# Patient Record
Sex: FEMALE | Race: WHITE | NOT HISPANIC OR LATINO | Employment: OTHER | URBAN - METROPOLITAN AREA
[De-identification: names, ages, dates, MRNs, and addresses within clinical notes are randomized per-mention and may not be internally consistent; named-entity substitution may affect disease eponyms.]

---

## 2017-04-27 ENCOUNTER — APPOINTMENT (EMERGENCY)
Dept: ULTRASOUND IMAGING | Facility: HOSPITAL | Age: 72
End: 2017-04-27
Payer: MEDICARE

## 2017-04-27 ENCOUNTER — APPOINTMENT (EMERGENCY)
Dept: RADIOLOGY | Facility: HOSPITAL | Age: 72
End: 2017-04-27
Payer: MEDICARE

## 2017-04-27 ENCOUNTER — HOSPITAL ENCOUNTER (EMERGENCY)
Facility: HOSPITAL | Age: 72
Discharge: HOME | End: 2017-04-27
Attending: EMERGENCY MEDICINE | Admitting: EMERGENCY MEDICINE
Payer: MEDICARE

## 2017-04-27 VITALS
RESPIRATION RATE: 16 BRPM | TEMPERATURE: 98.4 F | WEIGHT: 140 LBS | SYSTOLIC BLOOD PRESSURE: 110 MMHG | OXYGEN SATURATION: 98 % | DIASTOLIC BLOOD PRESSURE: 61 MMHG | HEART RATE: 62 BPM | BODY MASS INDEX: 25.61 KG/M2

## 2017-04-27 DIAGNOSIS — G56.01 CARPAL TUNNEL SYNDROME OF RIGHT WRIST: Primary | ICD-10-CM

## 2017-04-27 PROCEDURE — 73110 X-RAY EXAM OF WRIST: CPT

## 2017-04-27 PROCEDURE — 93971 EXTREMITY STUDY: CPT

## 2017-04-27 PROCEDURE — 99284 EMERGENCY DEPT VISIT MOD MDM: CPT

## 2017-04-27 RX ORDER — OXYCODONE HYDROCHLORIDE AND ACETAMINOPHEN 5; 325 MG/1; MG/1
1 TABLET ORAL EVERY 6 HOURS PRN
Qty: 6 TABLET | Refills: 0 | Status: SHIPPED | OUTPATIENT
Start: 2017-04-27 | End: 2017-05-07

## 2017-04-27 RX ORDER — OXYCODONE HYDROCHLORIDE 5 MG/1
5 TABLET ORAL ONCE
Status: COMPLETED | OUTPATIENT
Start: 2017-04-27 | End: 2017-04-27

## 2017-04-27 RX ORDER — METHYLPREDNISOLONE 4 MG/1
TABLET ORAL
Qty: 21 TABLET | Refills: 0 | Status: SHIPPED | OUTPATIENT
Start: 2017-04-27 | End: 2017-09-02

## 2017-04-27 RX ADMIN — OXYCODONE HYDROCHLORIDE 5 MG: 5 TABLET ORAL at 20:11

## 2017-05-03 ENCOUNTER — ALLSCRIPTS OFFICE VISIT (OUTPATIENT)
Dept: OTHER | Facility: OTHER | Age: 72
End: 2017-05-03

## 2017-05-09 ENCOUNTER — GENERIC CONVERSION - ENCOUNTER (OUTPATIENT)
Dept: OTHER | Facility: OTHER | Age: 72
End: 2017-05-09

## 2017-09-02 ENCOUNTER — HOSPITAL ENCOUNTER (EMERGENCY)
Facility: HOSPITAL | Age: 72
Discharge: HOME | End: 2017-09-02
Admitting: EMERGENCY MEDICINE
Payer: MEDICARE

## 2017-09-02 ENCOUNTER — APPOINTMENT (EMERGENCY)
Dept: RADIOLOGY | Facility: HOSPITAL | Age: 72
End: 2017-09-02
Payer: MEDICARE

## 2017-09-02 VITALS
SYSTOLIC BLOOD PRESSURE: 144 MMHG | OXYGEN SATURATION: 97 % | WEIGHT: 140 LBS | BODY MASS INDEX: 25.61 KG/M2 | HEART RATE: 56 BPM | TEMPERATURE: 98.3 F | RESPIRATION RATE: 16 BRPM | DIASTOLIC BLOOD PRESSURE: 81 MMHG

## 2017-09-02 DIAGNOSIS — M79.671 RIGHT FOOT PAIN: Primary | ICD-10-CM

## 2017-09-02 PROCEDURE — 99283 EMERGENCY DEPT VISIT LOW MDM: CPT

## 2017-09-02 PROCEDURE — 96372 THER/PROPH/DIAG INJ SC/IM: CPT

## 2017-09-02 PROCEDURE — 73630 X-RAY EXAM OF FOOT: CPT

## 2017-09-02 RX ORDER — KETOROLAC TROMETHAMINE 30 MG/ML
30 INJECTION, SOLUTION INTRAMUSCULAR; INTRAVENOUS ONCE
Status: COMPLETED | OUTPATIENT
Start: 2017-09-02 | End: 2017-09-02

## 2017-09-02 RX ORDER — ACETAMINOPHEN 325 MG/1
650 TABLET ORAL ONCE
Status: COMPLETED | OUTPATIENT
Start: 2017-09-02 | End: 2017-09-02

## 2017-09-02 RX ADMIN — ACETAMINOPHEN 650 MG: 325 TABLET ORAL at 12:35

## 2017-09-02 RX ADMIN — KETOROLAC TROMETHAMINE 30 MG: 30 INJECTION, SOLUTION INTRAMUSCULAR at 12:52

## 2017-10-12 ENCOUNTER — TRANSCRIBE ORDERS (OUTPATIENT)
Dept: ADMINISTRATIVE | Facility: HOSPITAL | Age: 72
End: 2017-10-12

## 2017-10-12 ENCOUNTER — HOSPITAL ENCOUNTER (OUTPATIENT)
Dept: RADIOLOGY | Facility: HOSPITAL | Age: 72
Discharge: HOME | End: 2017-10-12
Attending: PODIATRIST
Payer: MEDICARE

## 2017-10-12 DIAGNOSIS — M79.673 PAIN OF PLANTAR ASPECT OF HEEL: ICD-10-CM

## 2017-10-12 DIAGNOSIS — M25.872 MASS OF JOINT OF LEFT FOOT: Primary | ICD-10-CM

## 2017-10-12 DIAGNOSIS — G57.62 NEUROMA OF SECOND INTERSPACE OF LEFT FOOT: ICD-10-CM

## 2017-10-12 DIAGNOSIS — M25.872 MASS OF JOINT OF LEFT FOOT: ICD-10-CM

## 2017-10-12 PROCEDURE — 73630 X-RAY EXAM OF FOOT: CPT

## 2017-10-24 ENCOUNTER — TRANSCRIBE ORDERS (OUTPATIENT)
Dept: ADMINISTRATIVE | Facility: HOSPITAL | Age: 72
End: 2017-10-24

## 2017-10-24 DIAGNOSIS — Z12.31 ENCOUNTER FOR SCREENING MAMMOGRAM FOR MALIGNANT NEOPLASM OF BREAST: Primary | ICD-10-CM

## 2017-11-09 ENCOUNTER — HOSPITAL ENCOUNTER (OUTPATIENT)
Dept: MRI IMAGING | Facility: HOSPITAL | Age: 72
Discharge: HOME | End: 2017-11-09
Attending: PODIATRIST
Payer: MEDICARE

## 2017-11-09 DIAGNOSIS — G57.62 NEUROMA OF SECOND INTERSPACE OF LEFT FOOT: ICD-10-CM

## 2017-11-09 DIAGNOSIS — M25.872 MASS OF JOINT OF LEFT FOOT: ICD-10-CM

## 2017-11-09 PROCEDURE — A9585 GADOBUTROL INJECTION: HCPCS | Performed by: PODIATRIST

## 2017-11-09 PROCEDURE — 73720 MRI LWR EXTREMITY W/O&W/DYE: CPT

## 2017-11-09 RX ADMIN — GADOBUTROL 6 ML: 604.72 INJECTION INTRAVENOUS at 21:30

## 2017-12-14 ENCOUNTER — ANESTHESIA EVENT (OUTPATIENT)
Dept: PERIOP | Facility: HOSPITAL | Age: 72
End: 2017-12-14
Payer: MEDICARE

## 2017-12-14 RX ORDER — MULTIVITAMIN
1 CAPSULE ORAL DAILY
COMMUNITY

## 2017-12-14 RX ORDER — MELATONIN
1000 DAILY
COMMUNITY
End: 2018-07-17 | Stop reason: SDUPTHER

## 2017-12-14 RX ORDER — OMEGA-3S/DHA/EPA/FISH OIL/D3 300MG-1000
400 CAPSULE ORAL DAILY
COMMUNITY
End: 2018-07-17 | Stop reason: SDUPTHER

## 2017-12-14 NOTE — PRE-PROCEDURE INSTRUCTIONS
Pre-Surgery Instructions:   Medication Instructions    BIOTIN PO Instructed patient per Anesthesia Guidelines   cholecalciferol (VITAMIN D3) 1,000 units tablet Instructed patient per Anesthesia Guidelines   cholecalciferol (VITAMIN D3) 400 units tablet Instructed patient per Anesthesia Guidelines   citalopram (CeleXA) 20 mg tablet Instructed patient per Anesthesia Guidelines   hydroxychloroquine (PLAQUENIL) 200 mg tablet Instructed patient per Anesthesia Guidelines   Multiple Vitamin (MULTIVITAMIN) capsule Instructed patient per Anesthesia Guidelines   omeprazole (PriLOSEC) 20 mg delayed release capsule Instructed patient per Anesthesia Guidelines  Spoke to patient via telephone  Medications reviewed and patient was instructed to take omeprazole am of surgery with a sip of water as per anesthesia guidelines  Patient was instructed to avoid NSAIDs, Aspirin, vitamins, and supplements today and prior to surgery tomorrow  [facility] pre-op instructions reviewed  Pre-op bathing reviewed and patient was instructed to use chlorhexidine soap or dial antibacterial soap

## 2017-12-15 ENCOUNTER — ANESTHESIA (OUTPATIENT)
Dept: PERIOP | Facility: HOSPITAL | Age: 72
End: 2017-12-15
Payer: MEDICARE

## 2017-12-15 ENCOUNTER — HOSPITAL ENCOUNTER (OUTPATIENT)
Facility: HOSPITAL | Age: 72
Setting detail: OUTPATIENT SURGERY
Discharge: HOME | End: 2017-12-15
Attending: PODIATRIST | Admitting: PODIATRIST
Payer: MEDICARE

## 2017-12-15 VITALS
SYSTOLIC BLOOD PRESSURE: 122 MMHG | WEIGHT: 142 LBS | TEMPERATURE: 97.6 F | DIASTOLIC BLOOD PRESSURE: 67 MMHG | RESPIRATION RATE: 16 BRPM | HEIGHT: 61 IN | OXYGEN SATURATION: 99 % | HEART RATE: 71 BPM | BODY MASS INDEX: 26.81 KG/M2

## 2017-12-15 DIAGNOSIS — G57.62 LESION OF LEFT PLANTAR NERVE: ICD-10-CM

## 2017-12-15 DIAGNOSIS — Z98.890 S/P FOOT SURGERY, LEFT: Primary | ICD-10-CM

## 2017-12-15 DIAGNOSIS — M79.672 PAIN OF LEFT FOOT: ICD-10-CM

## 2017-12-15 LAB
ANION GAP SERPL CALCULATED.3IONS-SCNC: 3 MMOL/L (ref 4–13)
BUN SERPL-MCNC: 10 MG/DL (ref 5–25)
CALCIUM SERPL-MCNC: 9.8 MG/DL (ref 8.3–10.1)
CHLORIDE SERPL-SCNC: 107 MMOL/L (ref 100–108)
CO2 SERPL-SCNC: 31 MMOL/L (ref 21–32)
CREAT SERPL-MCNC: 0.66 MG/DL (ref 0.6–1.3)
GFR SERPL CREATININE-BSD FRML MDRD: 90 ML/MIN/1.73SQ M
GLUCOSE P FAST SERPL-MCNC: 80 MG/DL (ref 65–99)
GLUCOSE SERPL-MCNC: 80 MG/DL (ref 65–140)
POTASSIUM SERPL-SCNC: 5.6 MMOL/L (ref 3.5–5.3)
SODIUM SERPL-SCNC: 141 MMOL/L (ref 136–145)

## 2017-12-15 PROCEDURE — 88304 TISSUE EXAM BY PATHOLOGIST: CPT | Performed by: PODIATRIST

## 2017-12-15 PROCEDURE — 88312 SPECIAL STAINS GROUP 1: CPT | Performed by: PODIATRIST

## 2017-12-15 PROCEDURE — 80048 BASIC METABOLIC PNL TOTAL CA: CPT | Performed by: PODIATRIST

## 2017-12-15 PROCEDURE — 93005 ELECTROCARDIOGRAM TRACING: CPT | Performed by: PODIATRIST

## 2017-12-15 RX ORDER — ONDANSETRON 2 MG/ML
INJECTION INTRAMUSCULAR; INTRAVENOUS AS NEEDED
Status: DISCONTINUED | OUTPATIENT
Start: 2017-12-15 | End: 2017-12-15 | Stop reason: SURG

## 2017-12-15 RX ORDER — MIDAZOLAM HYDROCHLORIDE 1 MG/ML
INJECTION INTRAMUSCULAR; INTRAVENOUS AS NEEDED
Status: DISCONTINUED | OUTPATIENT
Start: 2017-12-15 | End: 2017-12-15 | Stop reason: SURG

## 2017-12-15 RX ORDER — MEPERIDINE HYDROCHLORIDE 50 MG/ML
12.5 INJECTION INTRAMUSCULAR; INTRAVENOUS; SUBCUTANEOUS AS NEEDED
Status: DISCONTINUED | OUTPATIENT
Start: 2017-12-15 | End: 2017-12-15 | Stop reason: HOSPADM

## 2017-12-15 RX ORDER — PROPOFOL 10 MG/ML
INJECTION, EMULSION INTRAVENOUS AS NEEDED
Status: DISCONTINUED | OUTPATIENT
Start: 2017-12-15 | End: 2017-12-15 | Stop reason: SURG

## 2017-12-15 RX ORDER — OXYCODONE HYDROCHLORIDE AND ACETAMINOPHEN 5; 325 MG/1; MG/1
1 TABLET ORAL EVERY 4 HOURS PRN
Qty: 25 TABLET | Refills: 0 | Status: SHIPPED | OUTPATIENT
Start: 2017-12-15 | End: 2017-12-25

## 2017-12-15 RX ORDER — FENTANYL CITRATE/PF 50 MCG/ML
25 SYRINGE (ML) INJECTION AS NEEDED
Status: DISCONTINUED | OUTPATIENT
Start: 2017-12-15 | End: 2017-12-15 | Stop reason: HOSPADM

## 2017-12-15 RX ORDER — ONDANSETRON 2 MG/ML
4 INJECTION INTRAMUSCULAR; INTRAVENOUS EVERY 6 HOURS PRN
Status: DISCONTINUED | OUTPATIENT
Start: 2017-12-15 | End: 2017-12-15 | Stop reason: HOSPADM

## 2017-12-15 RX ORDER — EPHEDRINE SULFATE 50 MG/ML
INJECTION, SOLUTION INTRAVENOUS AS NEEDED
Status: DISCONTINUED | OUTPATIENT
Start: 2017-12-15 | End: 2017-12-15 | Stop reason: SURG

## 2017-12-15 RX ORDER — FENTANYL CITRATE 50 UG/ML
INJECTION, SOLUTION INTRAMUSCULAR; INTRAVENOUS AS NEEDED
Status: DISCONTINUED | OUTPATIENT
Start: 2017-12-15 | End: 2017-12-15 | Stop reason: SURG

## 2017-12-15 RX ORDER — PROPOFOL 10 MG/ML
INJECTION, EMULSION INTRAVENOUS CONTINUOUS PRN
Status: DISCONTINUED | OUTPATIENT
Start: 2017-12-15 | End: 2017-12-15 | Stop reason: SURG

## 2017-12-15 RX ORDER — SODIUM CHLORIDE 9 MG/ML
125 INJECTION, SOLUTION INTRAVENOUS CONTINUOUS
Status: DISCONTINUED | OUTPATIENT
Start: 2017-12-15 | End: 2017-12-15 | Stop reason: HOSPADM

## 2017-12-15 RX ORDER — OXYCODONE HYDROCHLORIDE 5 MG/1
5 TABLET ORAL EVERY 4 HOURS PRN
Status: DISCONTINUED | OUTPATIENT
Start: 2017-12-15 | End: 2017-12-15 | Stop reason: HOSPADM

## 2017-12-15 RX ORDER — OXYCODONE HYDROCHLORIDE 5 MG/1
10 TABLET ORAL EVERY 6 HOURS PRN
Status: DISCONTINUED | OUTPATIENT
Start: 2017-12-15 | End: 2017-12-15 | Stop reason: HOSPADM

## 2017-12-15 RX ORDER — ACETAMINOPHEN 325 MG/1
650 TABLET ORAL EVERY 4 HOURS PRN
Status: DISCONTINUED | OUTPATIENT
Start: 2017-12-15 | End: 2017-12-15 | Stop reason: HOSPADM

## 2017-12-15 RX ADMIN — MIDAZOLAM HYDROCHLORIDE 2 MG: 1 INJECTION, SOLUTION INTRAMUSCULAR; INTRAVENOUS at 10:59

## 2017-12-15 RX ADMIN — EPHEDRINE SULFATE 10 MG: 50 INJECTION, SOLUTION INTRAMUSCULAR; INTRAVENOUS; SUBCUTANEOUS at 11:21

## 2017-12-15 RX ADMIN — FENTANYL CITRATE 25 MCG: 50 INJECTION INTRAMUSCULAR; INTRAVENOUS at 12:11

## 2017-12-15 RX ADMIN — FENTANYL CITRATE 50 MCG: 50 INJECTION INTRAMUSCULAR; INTRAVENOUS at 11:31

## 2017-12-15 RX ADMIN — PROPOFOL 100 MCG/KG/MIN: 10 INJECTION, EMULSION INTRAVENOUS at 11:09

## 2017-12-15 RX ADMIN — SODIUM CHLORIDE 125 ML/HR: 0.9 INJECTION, SOLUTION INTRAVENOUS at 12:01

## 2017-12-15 RX ADMIN — SODIUM CHLORIDE 125 ML/HR: 0.9 INJECTION, SOLUTION INTRAVENOUS at 08:05

## 2017-12-15 RX ADMIN — SODIUM CHLORIDE 125 ML/HR: 0.9 INJECTION, SOLUTION INTRAVENOUS at 12:30

## 2017-12-15 RX ADMIN — FENTANYL CITRATE 50 MCG: 50 INJECTION INTRAMUSCULAR; INTRAVENOUS at 10:59

## 2017-12-15 RX ADMIN — ONDANSETRON HYDROCHLORIDE 4 MG: 2 INJECTION, SOLUTION INTRAVENOUS at 11:44

## 2017-12-15 RX ADMIN — FENTANYL CITRATE 25 MCG: 50 INJECTION INTRAMUSCULAR; INTRAVENOUS at 12:06

## 2017-12-15 RX ADMIN — PROPOFOL 50 MG: 10 INJECTION, EMULSION INTRAVENOUS at 11:07

## 2017-12-15 NOTE — ANESTHESIA PREPROCEDURE EVALUATION
Review of Systems/Medical History  Patient summary reviewed  Chart reviewed  No history of anesthetic complications     Cardiovascular  Exercise tolerance: good,     Pulmonary  Sleep apnea CPAP, ,        GI/Hepatic    GERD well controlled,  Hiatal hernia, Bariatric surgery,        Kidney stones,        Endo/Other  Arthritis     GYN  Negative gynecology ROS          Hematology  Negative hematology ROS      Musculoskeletal  Rheumatoid arthritis Severity: moderate,        Neurology  Negative neurology ROS   No neuromuscular disease ,    Psychology   Negative psychology ROS            Physical Exam    Airway    Mallampati score: II         Dental   upper dentures and lower dentures,     Cardiovascular  Rhythm: regular, Rate: normal, Cardiovascular exam normal    Pulmonary  Pulmonary exam normal Breath sounds clear to auscultation,     Other Findings        Anesthesia Plan  ASA Score- 2       Anesthesia Type- IV sedation with anesthesia with ASA Monitors  Additional Monitors:   Airway Plan:     Comment: GA prn  Induction- intravenous  Informed Consent- Anesthetic plan and risks discussed with patient and spouse

## 2017-12-15 NOTE — DISCHARGE SUMMARY
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

## 2017-12-15 NOTE — OP NOTE
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]     * Pain of left foot [M79 672]    Procedure(s) (LRB):  2ND INTERSPACE MORTONS NEUROMA EXCISION (Left)    Specimen(s):  ID Type Source Tests Collected by Time Destination   1 : Left foot neuroma Tissue Neuroma TISSUE EXAM Duane Sox, DPM 12/15/2017 1140        Estimated Blood Loss:   Minimal     Anesthesia Type:   MAC with 8 cc 1:1 mix 1% lidocaine plain 0 5% bupivacaine plain    Hemostasis:  PNAT @ 250 mmHg for 30 minutes    Materials:  4-0 Vicryl  4-0 Nylon    Operative Indications:  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]  Pain of left foot [M79 672]    Operative Findings:  Excision of neuroma from 2nd interspace measuring 2 5 cm x 1 cm x 1 cm  Complications:   None    Procedure and Technique:  Under mild sedation, the patient was brought into the operating room and placed on the operating room table in the supine position  A pneumatic ankle tourniquet was then placed around the patient's left ankle with ample webril padding  A time out was performed to confirm the correct patient, procedure and site with all parties in agreement  Following IV sedation, local anesthetic was obtained about the patient's second intermetatarsal space consisting of 8 ml of 1% Lidocaine and 0 5% Bupivacaine in a 1:1 mixture  The foot was then scrubbed, prepped and draped in the usual aseptic manner  An esmarch bandage was utilized to exsangunate the patients foot and the pneumatic ankle tourniquet was then inflated  The esmarch bandage was removed and the foot was placed on the operating room table      Attention was directed to the second interspace of the Left foot where a 3cm linear longitudinal incision was made beginning distally at the webspace of the intermetatarsal area and extending proximally  The incision was deepened through the subcutaneous tissues  All vital structures were identified and retracted  All bleeders were ligated and cauterized as necessary  Dissection was then carried down into the second interspace using sharp and blunt dissection  The deep transverse intermetatarsal ligament was then identified and transected  Dissection was continued until visualization of the glistening white neural mass was identified beneath the deep transverse intermetatarsal ligament  The hypertrophied neural tissue was dissected distally from the distal digital branches which were freed from soft tissue attachments and hemostats were attached distally  The distal digital branches were then transected  Using the hemostats, the neuroma was then dissected proximally and the neuroma was transected at the most proximal portion, while tension was maintained on the nerve  The entire neural mass was resected, passed from the operative field, and sent to pathology for further evaluation  The wound was then flushed with copious amounts of sterile saline  The subcutaneous tissues were then closed with 4-0 Vicryl in a series of inverted interrupted sutures  The skin was then reapproximated with 4-0 nylon in a series of horizontal mattress sutures  The surgical site was then dressed with Betadine-soaked Adaptic, 4x4s, Kerlix, and an Ace wrap  The pneumatic ankle tourniquet was then deflated after total of 30 minutes and a prompt hyperemic response was noted to the patient's left foot  She was then awoke from surgery and transferred to the postanesthesia care unit with all vital signs stable      Patient Disposition:  PACU     SIGNATURE: Jordan Duenas DPM  DATE: December 15, 2017  TIME: 12:01 PM

## 2017-12-15 NOTE — INTERIM OP NOTE
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

## 2017-12-15 NOTE — DISCHARGE INSTRUCTIONS
Dr Aiyana Reyna Instructions    1  Take your prescribed medication as directed  2  Upon arrival at home, lie down and elevate your surgical foot on 2 pillows  3  Remain quiet, off your feet as much as possible, for the first 24-48 hours  This is when your feet first swell and may become painful  After 48 hours you may begin limited walking following these restrictions:   Nonweightbearing to LLE with surgical shoe with crutches/walker for first 48 hours after surgery  Starting Sunday, you may be heel touch with a surgical shoe  4  Drink large quantities of water and citrus fruit juice  Consume no alcohol  Continue a well-balanced diet  5  Report any unusual discomfort or fever to this office  6  A limited amount of discomfort and swelling is to be expected  In some cases the skin may take on a bruised appearance  The surgical solution that was applied to your foot prior to the operation is dark in color and the operation site may appear to be oozing when it actually is not  7  A slight amount of blood is to be expected, and is no cause for alarm  Do not remove the dressings  If there is active bleeding and if the bleeding persists, add additional gauze to the bandage, apply direct pressure, elevate your feet and call this office  8  Do not get the dressings wet  As regular bathing may be inconvenient, sponge baths are recommended  9  When anesthesia wears off and if any discomfort should be present, apply an ice pack directly over the operated area for 15 minute intervals for several hours or until the pain leaves  (USE IN EXCESS OF 15 MINUTES COULD CAUSE FROSTBITE)  Do not use hot water bags or electric pads  A convenient icepack can be made by placing ice cubes in a plastic bag and covering this with a towel  10  If necessary, take a mild laxative before retiring  11  Wear your special open shoes anytime you put weight on your foot, even if it is just to walk to the bathroom and back   It will probably be 2 or 3 weeks before you will be permitted to try regular shoes  12  Having performed the operation, we are interested in a prompt recovery  Please cooperate by following the above instructions  13  Please call to confirm your post-op appointment or call with any other questions

## 2017-12-17 LAB
ATRIAL RATE: 60 BPM
P AXIS: 44 DEGREES
PR INTERVAL: 176 MS
QRS AXIS: -9 DEGREES
QRSD INTERVAL: 90 MS
QT INTERVAL: 432 MS
QTC INTERVAL: 432 MS
T WAVE AXIS: 31 DEGREES
VENTRICULAR RATE: 60 BPM

## 2018-01-12 ENCOUNTER — TRANSCRIBE ORDERS (OUTPATIENT)
Dept: ADMINISTRATIVE | Facility: HOSPITAL | Age: 73
End: 2018-01-12

## 2018-01-12 DIAGNOSIS — E21.0 PRIMARY HYPERPARATHYROIDISM (HCC): Primary | ICD-10-CM

## 2018-01-12 NOTE — PROCEDURES
Procedures by Tom Bennett at 7/14/2016  3:11 PM      Author:  UNA Bruno Service:  Trauma Author Type:  Nurse Practitioner    Filed:  7/14/2016  3:14 PM Date of Service:  7/14/2016  3:11 PM Status:  Signed    :  Tom Bennett (Nurse Practitioner)  Cosigner:  Channing Ganser, MD at 7/15/2016  8:25 AM      Procedure Orders:       1  LACERATION REPAIR [[id number]] ordered by UNA Bruno at 07/14/16 1511                 Post-procedure Diagnoses:       1  Elbow abrasion [S50 319A]                   Laceration Repair  Date/Time: 7/14/2016 3:11 PM  Performed by: Renard Peralta  Authorized by: Renard Peralta     Consent:     Consent obtained:  Verbal    Consent given by:  Patient  Universal protocol:     Patient identity confirmed:  Verbally with patient  Anesthesia (see MAR for exact dosages): Anesthesia method:  Local infiltration    Local anesthetic:  Lidocaine 1% w/o epi  Repair type:     Repair type:  Simple  Treatment:     Area cleansed with:  Saline    Amount of cleaning:  Extensive  Skin repair:     Repair method:  Sutures (1)    Suture size:  3-0    Suture technique:  Simple interrupted  Approximation:     Approximation difficulty:  Simple    Vermilion border: well-aligned    Post-procedure details:     Dressing:  Open (no dressing)  Comments:      Rn to place non-adherent dressing to left elbow                           Received for:Provider  EPIC   Jul 15 2016  8:24AM [facility] Standard Time

## 2018-01-12 NOTE — MISCELLANEOUS
Message   Recorded as Task   Date: 05/09/2017 09:25 AM, Created By: Jono Galvan   Task Name: Follow Up   Assigned To: Luz Maria Bear   Regarding Patient: Anjelica Meyer, Status: Active   CommentMaris Mariel - 09 May 2017 9:25 AM     TASK CREATED  Please call patient to schedule f/u appt  with our office  Thank you  Per assigned task, I left message for [facility] patient about scheduling an appointment at our office since patient is overdue for a follow up  Active Problems    1  Abdominal pain (789 00) (R10 9)   2  Abnormal blood chemistry (790 6) (R79 9)   3  Arthritis (716 90) (M19 90)   4  Carpal tunnel syndrome of right wrist (354 0) (G56 01)   5  Constipation (564 00) (K59 00)   6  Depression (311) (F32 9)   7  Dysphagia (787 20) (R13 10)   8  Edema (782 3) (R60 9)   9  Heart burn (787 1) (R12)   10  Nephrolithiasis (592 0) (N20 0)   11  Obesity (278 00) (E66 9)   12  Obesity surgery status (V45 86) (Z98 84)   13  Postgastrectomy malabsorption (579 3) (K91 2,Z90 3)   14  Pre-operative cardiovascular examination (V72 81) (Z01 810)   15  Shortness of breath (786 05) (R06 02)    Current Meds   1  Biotin CAPS; Therapy: (Recorded:09Sep2015) to Recorded   2  Citalopram Hydrobromide 10 MG Oral Tablet Recorded   3  FLUoxetine HCl - 20 MG Oral Tablet; Therapy: 20UEK1313 to (Evaluate:09Oct2015) Recorded   4  Hydroxychloroquine Sulfate TABS Recorded   5  Multi-Vitamin/Fluoride 1 MG CHEW; tid; Therapy: (Recorded:31Jan2014) to Recorded   6  Omeprazole 20 MG Oral Capsule Delayed Release; TAKE 1 CAPSULE TWICE DAILY; Therapy: 12EOU5332 to (Evaluate:21Mar2016)  Requested for: 92APD6801; Last   Rx:27Mar2015; Status: ACTIVE - Renewal Denied Ordered   7  Vitamin B6 TABS; Therapy: (Recorded:09Sep2015) to Recorded    Allergies    1  Amoxicillin CAPS   2  Darvocet A500 TABS   3  Darvon CAPS   4   Demerol TABS    Signatures   Electronically signed by : Po Hunt, ; May  9 2017 10:42AM EST (Author)

## 2018-01-13 VITALS
WEIGHT: 142 LBS | HEART RATE: 71 BPM | BODY MASS INDEX: 26.13 KG/M2 | DIASTOLIC BLOOD PRESSURE: 72 MMHG | HEIGHT: 62 IN | SYSTOLIC BLOOD PRESSURE: 122 MMHG

## 2018-01-14 NOTE — MISCELLANEOUS
Provider Comments  Provider Comments:     Dear Blair Bolden had a scheduled appointment at our office today but were unable to keep  We attempted to call you back but were unable to reach you  It is very important that you follow up with us so that we can assess your physical and nutritional safety after your surgery  Please call our office at [phone] to reschedule your appointment       Sincerely,     [facility]        Signatures   Electronically signed by : Rohit Cordero, ; Mar  9 2016  8:02AM EST                       (Author)    Electronically signed by : Jr Martínez, [facility]; Mar  9 2016  8:18AM EST                       (Author)

## 2018-02-17 ENCOUNTER — HOSPITAL ENCOUNTER (OUTPATIENT)
Dept: MAMMOGRAPHY | Facility: HOSPITAL | Age: 73
Discharge: HOME | End: 2018-02-17
Payer: MEDICARE

## 2018-02-17 ENCOUNTER — TRANSCRIBE ORDERS (OUTPATIENT)
Dept: ADMINISTRATIVE | Facility: HOSPITAL | Age: 73
End: 2018-02-17

## 2018-02-17 DIAGNOSIS — Z12.31 ENCOUNTER FOR SCREENING MAMMOGRAM FOR MALIGNANT NEOPLASM OF BREAST: ICD-10-CM

## 2018-02-17 PROCEDURE — 77067 SCR MAMMO BI INCL CAD: CPT

## 2018-02-17 PROCEDURE — 77063 BREAST TOMOSYNTHESIS BI: CPT

## 2018-02-26 ENCOUNTER — TRANSCRIBE ORDERS (OUTPATIENT)
Dept: ADMINISTRATIVE | Facility: HOSPITAL | Age: 73
End: 2018-02-26

## 2018-02-26 DIAGNOSIS — R79.89 ABNORMAL THYROID BLOOD TEST: Primary | ICD-10-CM

## 2018-03-01 ENCOUNTER — HOSPITAL ENCOUNTER (OUTPATIENT)
Dept: NUCLEAR MEDICINE | Facility: HOSPITAL | Age: 73
Discharge: HOME | End: 2018-03-01
Attending: SPECIALIST
Payer: MEDICARE

## 2018-03-01 DIAGNOSIS — R79.89 ABNORMAL THYROID BLOOD TEST: ICD-10-CM

## 2018-03-01 PROCEDURE — A9500 TC99M SESTAMIBI: HCPCS

## 2018-03-01 PROCEDURE — 78072 PARATHYRD PLANAR W/SPECT&CT: CPT

## 2018-06-06 ENCOUNTER — OFFICE VISIT (OUTPATIENT)
Dept: ENDOCRINOLOGY | Facility: CLINIC | Age: 73
End: 2018-06-06
Payer: MEDICARE

## 2018-06-06 ENCOUNTER — TELEPHONE (OUTPATIENT)
Dept: BARIATRICS | Facility: CLINIC | Age: 73
End: 2018-06-06

## 2018-06-06 VITALS
HEIGHT: 61 IN | SYSTOLIC BLOOD PRESSURE: 110 MMHG | BODY MASS INDEX: 27.89 KG/M2 | WEIGHT: 147.7 LBS | HEART RATE: 70 BPM | DIASTOLIC BLOOD PRESSURE: 64 MMHG

## 2018-06-06 DIAGNOSIS — D35.00 ADRENAL ADENOMA, UNSPECIFIED LATERALITY: ICD-10-CM

## 2018-06-06 DIAGNOSIS — E21.3 HYPERPARATHYROIDISM (HCC): Primary | ICD-10-CM

## 2018-06-06 DIAGNOSIS — M81.0 OSTEOPOROSIS WITHOUT CURRENT PATHOLOGICAL FRACTURE, UNSPECIFIED OSTEOPOROSIS TYPE: ICD-10-CM

## 2018-06-06 PROCEDURE — 99205 OFFICE O/P NEW HI 60 MIN: CPT | Performed by: INTERNAL MEDICINE

## 2018-06-06 NOTE — TELEPHONE ENCOUNTER
----- Message from Dinesh Posadas RN sent at 2018 10:05 AM EDT -----  Regardin year f/u  Please call patient to schedule a 4 year follow up appointment  Thank You

## 2018-06-06 NOTE — PROGRESS NOTES
González Dexter 81 y o  female MRN: [id number]    Encounter: [id number]      Assessment/Plan     Problem List Items Addressed This Visit     Hyperparathyroidism 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 given multiple family members with hyperparathyroidism - advised to get prior records as well as info from niece who she says has been diagnosed with MEN syndrome   Currently calcium within normal range   She has adrenal adenoma that will require further workup         Relevant Orders    Phosphorus Lab Collect    PTH, intact Lab Collect Lab Collect    T4, free Lab Collect    TSH, 3rd generation Lab Collect    Vitamin D 25 hydroxy Lab Collect    Calcium, urine, 24 hour Lab Collect    Osteoporosis without current pathological fracture     Counseled on increased risk of fracture ,fall prevention ,increase dietary calcium - discuss pharmacologic therapy next visit         Relevant Orders    Vitamin D 25 hydroxy Lab Collect    Calcium, urine, 24 hour Lab Collect    Adrenal adenoma     Patient doesn't recall ever been told that she has adrenal adenoma - reported to be stable in 2016 - will do hormonal workup          Relevant Orders    Catecholamines, fractionated, urine, 24 hour Lab Collect    Metanephrines Fractionated, urine, 24 hour- Lab Collect        CC:   Hyperparathyroidism     History of Present Illness     HPI: 82 y/o woman referred here for evaluation of hyperparathyroidism - she states that she had 2 parathyroid surgeries - in 1980s and 90s -  2nd surgery - mediastinal exploration- left arm implantation of  parathyroid gland   Not aware of hypercalcemia - wither before or after parathyroid surgery   history of kidney stones - last episode about 8 years -  Ankle fracture >10 years -  History of osteoporosis - never been on any pharmacologic therapy   - history of vitamin D deficiency - takes Vitamin D supplementations -1000 iu three days a week    No calcium supplementations - occasional dairy in diet  History of gastric sleeve surgery 5 years back- lost 150 lbs in the next couple of years - gained 20 lbs back  Father - 56-  related parathyroid issues ? Sister had parathyroid and pituitary problems   Niece - also has parathyroid /pituitary problems - ? Has been diagnosed with MEN   Nephew - parathyroid surgery   Son- parathyroid surgery -in early 35s - not known if hypercalcemia   No FH  Of adrenal or pancreatic problems   Review of Systems   Constitutional: Positive for fatigue  Negative for unexpected weight change  Respiratory: Negative for cough and shortness of breath  Cardiovascular: Negative for chest pain, palpitations and leg swelling  Gastrointestinal: Negative for constipation, diarrhea, nausea and vomiting  Endocrine: Negative for polydipsia and polyuria  Musculoskeletal: Positive for arthralgias  Negative for gait problem  Skin: Negative for color change, pallor, rash and wound  Psychiatric/Behavioral: Negative for sleep disturbance  All other systems reviewed and are negative        Historical Information   Past Medical History:   Diagnosis Date    Full dentures     GERD (gastroesophageal reflux disease)     Hiatal hernia     Kidney stone     [facility] [address] [facility]     [facility]umat[facility] arthritis [facility] [address] [facility]     Sleep apnea     resolved since gastric sleeve surgery    Wears glasses      Past Surgical History:   Procedure Laterality Date    BARIATRIC SURGERY      CHOLECYSTECTOMY      GANGLION CYST EXCISION Left 12/15/2017    Procedure: 2ND INTERSPACE MORTONS NEUROMA EXCISION;  Surgeon: Reymundo Nava DPM;  Location: [facility];  Service: Podiatry    HYSTERECTOMY      LITHOTRIPSY      PARATHYROID GLAND SURGERY      x2    SLEEVE GASTROPLASTY      TONSILLECTOMY       Social History   History   Alcohol Use    Yes     Comment: socially     History   Drug Use No     History   Smoking Status    Former Smoker Smokeless Tobacco    Never Used     Comment: quit over 30 years     Family History:   Family History   Problem Relation Age of Onset    Hyperparathyroidism Father     Hyperparathyroidism Sister     Hyperparathyroidism Family        Meds/Allergies   Current Outpatient Prescriptions   Medication Sig Dispense Refill    BIOTIN PO Take 1 tablet by mouth daily      cholecalciferol (VITAMIN D3) 1,000 units tablet Take 1,000 Units by mouth daily      citalopram (CeleXA) 20 mg tablet Take 20 mg by mouth daily   hydroxychloroquine (PLAQUENIL) 200 mg tablet Take 200 mg by mouth daily   Multiple Vitamin (MULTIVITAMIN) capsule Take 1 capsule by mouth daily      omeprazole (PriLOSEC) 20 mg delayed release capsule Take 20 mg by mouth daily   cholecalciferol (VITAMIN D3) 400 units tablet Take 400 Units by mouth daily       No current facility-administered medications for this visit  Allergies   Allergen Reactions    Amoxicillin Anaphylaxis    Penicillins Anaphylaxis    Darvon [Propoxyphene]      Generalized numbness    Demerol [Meperidine] Other (See Comments)     Generalized numbness    Nsaids Other (See Comments)     S/p gastric surgery       Objective   Vitals: Blood pressure 110/64, pulse 70, height 5' 1" (1 549 m), weight 67 kg (147 lb 11 2 oz)  Physical Exam   Constitutional: She is oriented to person, place, and time  She appears well-developed and well-nourished  No distress  HENT:   Head: Normocephalic and atraumatic  Mouth/Throat: No oropharyngeal exudate  Eyes: Conjunctivae and EOM are normal  No scleral icterus  Neck: Normal range of motion  Neck supple  No thyromegaly present  Cardiovascular: Normal rate, regular rhythm and normal heart sounds  No murmur heard  Pulmonary/Chest: Effort normal and breath sounds normal  No respiratory distress  She has no wheezes  She has no rales  Abdominal: Soft  Bowel sounds are normal  She exhibits no distension   There is no tenderness  There is no rebound  Musculoskeletal: Normal range of motion  She exhibits no edema or deformity  Lymphadenopathy:     She has no cervical adenopathy  Neurological: She is alert and oriented to person, place, and time  Skin: Skin is warm and dry  No rash noted  No erythema  No pallor  Psychiatric: She has a normal mood and affect  Her behavior is normal  Judgment and thought content normal        The history was obtained from the review of the chart, patient  Lab Results:   Lab Results   Component Value Date/Time    Sodium 141 12/15/2017 08:00 AM    Potassium 5 6 (H) 12/15/2017 08:00 AM    Chloride 107 12/15/2017 08:00 AM    CO2 31 12/15/2017 08:00 AM    BUN 10 12/15/2017 08:00 AM    Creatinine 0 66 12/15/2017 08:00 AM    Glucose 80 12/15/2017 08:00 AM    Glucose, Fasting 80 12/15/2017 08:00 AM    Calcium 9 8 12/15/2017 08:00 AM    Anion Gap 3 (L) 12/15/2017 08:00 AM    eGFR 90 12/15/2017 08:00 AM     Jan 2018- calcium 9 9 - albumin 3 8 , vitamin d 25 OH-51       Imaging Studies:     dexa scan dec 2016    Impression: Osteoporosis of the left femoral neck  No evidence of osteopenia or  osteoporosis of the lumbar spine  Currently there is substantial increased  fracture risk in the left femoral neck    March 2015    CT ABDOMEN AND PELVIS WITH IV CONTRAST     IMPRESSION-   1  Stable postoperative changes after gastric sleeve with mild   inflammatory change again noted between the gastric fundus and spleen  Additionally, there appears to be a small collection with air-fluid   level that could be extraluminal in this region and is concerning for   possible infectious process  A smaller rim-enhancing collection noted   medially on the prior study has since resolved  Of note, there is no   evidence of free air  Enteric contrast or was not utilized  2   Stable right adrenal nodule, likely adenoma  Transcribed on- [id number]     I have personally reviewed pertinent reports  Portions of the record may have been created with voice recognition software  Occasional wrong word or "sound a like" substitutions may have occurred due to the inherent limitations of voice recognition software  Read the chart carefully and recognize, using context, where substitutions have occurred

## 2018-06-06 NOTE — LETTER
June 14, 2018     Paty Corona MD  [address] [place] [address], [facility] [address]    Patient: Lb Joshua   YOB: 1945   Date of Visit: 6/6/2018       Dear Dr Edenilson Sweet: Thank you for referring Jose F Carreon to me for evaluation  Below are my notes for this consultation  If you have questions, please do not hesitate to call me  I look forward to following your patient along with you  Sincerely,        Bisi Green MD        CC: MD Bisi Restrepo MD  6/14/2018 12:50 AM  Sign at close encounter   Lb Joshua 81 y o  female MRN: [id number]    Encounter: [id number]      Assessment/Plan     Problem List Items Addressed This Visit     Hyperparathyroidism [facility]) - Primary     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 given multiple family members with hyperparathyroidism - advised to get prior records as well as info from niece who she says has been diagnosed with MEN syndrome     Currently calcium within normal range   She has adrenal adenoma that will require further workup         Relevant Orders    Phosphorus Lab Collect    PTH, intact Lab Collect Lab Collect    T4, free Lab Collect    TSH, 3rd generation Lab Collect    Vitamin D 25 hydroxy Lab Collect    Calcium, urine, 24 hour Lab Collect    Osteoporosis without current pathological fracture     Counseled on increased risk of fracture ,fall prevention ,increase dietary calcium - discuss pharmacologic therapy next visit         Relevant Orders    Vitamin D 25 hydroxy Lab Collect    Calcium, urine, 24 hour Lab Collect    Adrenal adenoma     Patient doesn't recall ever been told that she has adrenal adenoma - reported to be stable in 2016 - will do hormonal workup          Relevant Orders    Catecholamines, fractionated, urine, 24 hour Lab Collect    Metanephrines Fractionated, urine, 24 hour- Lab Collect        CC:   Hyperparathyroidism     History of Present Illness     HPI: 80 y/o woman referred here for evaluation of hyperparathyroidism - she states that she had 2 parathyroid surgeries - in 1980s and 90s -  2nd surgery - mediastinal exploration- left arm implantation of  parathyroid gland   Not aware of hypercalcemia - wither before or after parathyroid surgery   history of kidney stones - last episode about 8 years -  Ankle fracture >10 years -  History of osteoporosis - never been on any pharmacologic therapy   - history of vitamin D deficiency - takes Vitamin D supplementations -1000 iu three days a week  No calcium supplementations - occasional dairy in diet  History of gastric sleeve surgery 5 years back- lost 150 lbs in the next couple of years - gained 20 lbs back  Father - 56-  related parathyroid issues ? Sister had parathyroid and pituitary problems   Niece - also has parathyroid /pituitary problems - ? Has been diagnosed with MEN   Nephew - parathyroid surgery   Son- parathyroid surgery -in early 35s - not known if hypercalcemia   No FH  Of adrenal or pancreatic problems   Review of Systems   Constitutional: Positive for fatigue  Negative for unexpected weight change  Respiratory: Negative for cough and shortness of breath  Cardiovascular: Negative for chest pain, palpitations and leg swelling  Gastrointestinal: Negative for constipation, diarrhea, nausea and vomiting  Endocrine: Negative for polydipsia and polyuria  Musculoskeletal: Positive for arthralgias  Negative for gait problem  Skin: Negative for color change, pallor, rash and wound  Psychiatric/Behavioral: Negative for sleep disturbance  All other systems reviewed and are negative        Historical Information   Past Medical History:   Diagnosis Date    Full dentures     GERD (gastroesophageal reflux disease)     Hiatal hernia     Kidney stone     [facility])     Rheumatoid arthritis ([facility] [address] )     Sleep apnea     resolved since gastric sleeve surgery    Wears glasses      Past Surgical History:   Procedure Laterality Date    BARIATRIC SURGERY      CHOLECYSTECTOMY      GANGLION CYST EXCISION Left 12/15/2017    Procedure: 2ND INTERSPACE MORTONS NEUROMA EXCISION;  Surgeon: Emma Holder DPM;  Location: [facility];  Service: Podiatry    HYSTERECTOMY      LITHOTRIPSY      PARATHYROID GLAND SURGERY      x2    SLEEVE GASTROPLASTY      TONSILLECTOMY       Social History   History   Alcohol Use    Yes     Comment: socially     History   Drug Use No     History   Smoking Status    Former Smoker   Smokeless Tobacco    Never Used     Comment: quit over 30 years     Family History:   Family History   Problem Relation Age of Onset    Hyperparathyroidism Father     Hyperparathyroidism Sister     Hyperparathyroidism Family        Meds/Allergies   Current Outpatient Prescriptions   Medication Sig Dispense Refill    BIOTIN PO Take 1 tablet by mouth daily      cholecalciferol (VITAMIN D3) 1,000 units tablet Take 1,000 Units by mouth daily      citalopram (CeleXA) 20 mg tablet Take 20 mg by mouth daily   hydroxychloroquine (PLAQUENIL) 200 mg tablet Take 200 mg by mouth daily   Multiple Vitamin (MULTIVITAMIN) capsule Take 1 capsule by mouth daily      omeprazole (PriLOSEC) 20 mg delayed release capsule Take 20 mg by mouth daily   cholecalciferol (VITAMIN D3) 400 units tablet Take 400 Units by mouth daily       No current facility-administered medications for this visit  Allergies   Allergen Reactions    Amoxicillin Anaphylaxis    Penicillins Anaphylaxis    Darvon [Propoxyphene]      Generalized numbness    Demerol [Meperidine] Other (See Comments)     Generalized numbness    Nsaids Other (See Comments)     S/p gastric surgery       Objective   Vitals: Blood pressure 110/64, pulse 70, height 5' 1" (1 549 m), weight 67 kg (147 lb 11 2 oz)  Physical Exam   Constitutional: She is oriented to person, place, and time  She appears well-developed and well-nourished   No distress  HENT:   Head: Normocephalic and atraumatic  Mouth/Throat: No oropharyngeal exudate  Eyes: Conjunctivae and EOM are normal  No scleral icterus  Neck: Normal range of motion  Neck supple  No thyromegaly present  Cardiovascular: Normal rate, regular rhythm and normal heart sounds  No murmur heard  Pulmonary/Chest: Effort normal and breath sounds normal  No respiratory distress  She has no wheezes  She has no rales  Abdominal: Soft  Bowel sounds are normal  She exhibits no distension  There is no tenderness  There is no rebound  Musculoskeletal: Normal range of motion  She exhibits no edema or deformity  Lymphadenopathy:     She has no cervical adenopathy  Neurological: She is alert and oriented to person, place, and time  Skin: Skin is warm and dry  No rash noted  No erythema  No pallor  Psychiatric: She has a normal mood and affect  Her behavior is normal  Judgment and thought content normal        The history was obtained from the review of the chart, patient  Lab Results:   Lab Results   Component Value Date/Time    Sodium 141 12/15/2017 08:00 AM    Potassium 5 6 (H) 12/15/2017 08:00 AM    Chloride 107 12/15/2017 08:00 AM    CO2 31 12/15/2017 08:00 AM    BUN 10 12/15/2017 08:00 AM    Creatinine 0 66 12/15/2017 08:00 AM    Glucose 80 12/15/2017 08:00 AM    Glucose, Fasting 80 12/15/2017 08:00 AM    Calcium 9 8 12/15/2017 08:00 AM    Anion Gap 3 (L) 12/15/2017 08:00 AM    eGFR 90 12/15/2017 08:00 AM     Jan 2018- calcium 9 9 - albumin 3 8 , vitamin d 25 OH-51       Imaging Studies:     dexa scan dec 2016    Impression: Osteoporosis of the left femoral neck  No evidence of osteopenia or  osteoporosis of the lumbar spine   Currently there is substantial increased  fracture risk in the left femoral neck    March 2015    CT ABDOMEN AND PELVIS WITH IV CONTRAST     IMPRESSION-   1  Stable postoperative changes after gastric sleeve with mild   inflammatory change again noted between the gastric fundus and spleen  Additionally, there appears to be a small collection with air-fluid   level that could be extraluminal in this region and is concerning for   possible infectious process  A smaller rim-enhancing collection noted   medially on the prior study has since resolved  Of note, there is no   evidence of free air  Enteric contrast or was not utilized  2   Stable right adrenal nodule, likely adenoma  Transcribed on- [id number]     I have personally reviewed pertinent reports  Portions of the record may have been created with voice recognition software  Occasional wrong word or "sound a like" substitutions may have occurred due to the inherent limitations of voice recognition software  Read the chart carefully and recognize, using context, where substitutions have occurred

## 2018-06-14 PROBLEM — M81.0 OSTEOPOROSIS WITHOUT CURRENT PATHOLOGICAL FRACTURE: Status: ACTIVE | Noted: 2018-06-14

## 2018-06-14 PROBLEM — D35.00 ADRENAL ADENOMA: Status: ACTIVE | Noted: 2018-06-14

## 2018-06-14 PROBLEM — E21.3 HYPERPARATHYROIDISM (HCC): Status: ACTIVE | Noted: 2018-06-14

## 2018-06-14 NOTE — ASSESSMENT & PLAN NOTE
Patient doesn't recall ever been told that she has adrenal adenoma - reported to be stable in 2016 - will do hormonal workup

## 2018-06-14 NOTE — ASSESSMENT & PLAN NOTE
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

## 2018-06-14 NOTE — ASSESSMENT & PLAN NOTE
Counseled on increased risk of fracture ,fall prevention ,increase dietary calcium - discuss pharmacologic therapy next visit

## 2018-07-17 ENCOUNTER — OFFICE VISIT (OUTPATIENT)
Dept: ENDOCRINOLOGY | Facility: CLINIC | Age: 73
End: 2018-07-17
Payer: MEDICARE

## 2018-07-17 VITALS
HEIGHT: 60 IN | DIASTOLIC BLOOD PRESSURE: 66 MMHG | SYSTOLIC BLOOD PRESSURE: 108 MMHG | BODY MASS INDEX: 28.86 KG/M2 | WEIGHT: 147 LBS | HEART RATE: 74 BPM

## 2018-07-17 DIAGNOSIS — E21.3 HYPERPARATHYROIDISM (HCC): Primary | ICD-10-CM

## 2018-07-17 DIAGNOSIS — D35.01 ADENOMA OF RIGHT ADRENAL GLAND: ICD-10-CM

## 2018-07-17 DIAGNOSIS — M81.0 OSTEOPOROSIS WITHOUT CURRENT PATHOLOGICAL FRACTURE, UNSPECIFIED OSTEOPOROSIS TYPE: ICD-10-CM

## 2018-07-17 DIAGNOSIS — R53.83 FATIGUE, UNSPECIFIED TYPE: ICD-10-CM

## 2018-07-17 LAB
25(OH)D3 SERPL-MCNC: 40.1 NG/ML (ref 30–100)
ALBUMIN SERPL BCP-MCNC: 3.3 G/DL (ref 3.5–5)
CALCIUM SERPL-MCNC: 9.2 MG/DL (ref 8.3–10.1)
MAGNESIUM SERPL-MCNC: 2.2 MG/DL (ref 1.6–2.6)
PHOSPHATE SERPL-MCNC: 2.9 MG/DL (ref 2.3–4.1)
PTH-INTACT SERPL-MCNC: 290 PG/ML (ref 18.4–80.1)
T4 FREE SERPL-MCNC: 0.77 NG/DL (ref 0.76–1.46)
TSH SERPL DL<=0.05 MIU/L-ACNC: 2.15 UIU/ML (ref 0.36–3.74)

## 2018-07-17 PROCEDURE — 84443 ASSAY THYROID STIM HORMONE: CPT | Performed by: INTERNAL MEDICINE

## 2018-07-17 PROCEDURE — 83970 ASSAY OF PARATHORMONE: CPT | Performed by: INTERNAL MEDICINE

## 2018-07-17 PROCEDURE — 82310 ASSAY OF CALCIUM: CPT | Performed by: INTERNAL MEDICINE

## 2018-07-17 PROCEDURE — 82040 ASSAY OF SERUM ALBUMIN: CPT | Performed by: INTERNAL MEDICINE

## 2018-07-17 PROCEDURE — 99215 OFFICE O/P EST HI 40 MIN: CPT | Performed by: INTERNAL MEDICINE

## 2018-07-17 PROCEDURE — 84439 ASSAY OF FREE THYROXINE: CPT | Performed by: INTERNAL MEDICINE

## 2018-07-17 PROCEDURE — 36415 COLL VENOUS BLD VENIPUNCTURE: CPT | Performed by: INTERNAL MEDICINE

## 2018-07-17 PROCEDURE — 83735 ASSAY OF MAGNESIUM: CPT | Performed by: INTERNAL MEDICINE

## 2018-07-17 PROCEDURE — 82306 VITAMIN D 25 HYDROXY: CPT | Performed by: INTERNAL MEDICINE

## 2018-07-17 PROCEDURE — 84100 ASSAY OF PHOSPHORUS: CPT | Performed by: INTERNAL MEDICINE

## 2018-07-17 RX ORDER — MULTIVIT-MIN/IRON/FOLIC ACID/K 18-600-40
1 CAPSULE ORAL DAILY
COMMUNITY

## 2018-07-17 RX ORDER — AMOXICILLIN 500 MG
1 CAPSULE ORAL DAILY
COMMUNITY
End: 2019-11-04

## 2018-07-17 NOTE — LETTER
July 17, 2018     [address]  [address]  [address] [place] [address] Road [address]    Patient: Bhavna Godfrey   YOB: 1945   Date of Visit: 7/17/2018       Dear Dr Elidia Rodriguez: Thank you for referring Sudheer Davis to me for evaluation  Below are my notes for this consultation  If you have questions, please do not hesitate to call me  I look forward to following your patient along with you  Sincerely,        Anitha Angeles MD        CC: MD Anitha Carpio MD  7/17/2018  8:58 AM  Sign at close encounter  Assessment/Plan:    Hyperparathyroidism [facility])  She likely has either familial hyperparathyroidism syndrome or MEN-1  She did have a parathyroid level in January of greater than 800  Today, I have ordered another parathyroid level, calcium, albumin, phosphorus and magnesium  I have also ordered an ultrasound of the kidneys to rule out nephrocalcinosis  Her sestamibi scan does show a residual parathyroid gland in the left neck  If the repeat testing does show elevated PTH along with high calcium or high normal calcium, I think it would be reasonable to proceed with surgical intervention as long as there is no evidence of pheochromocytoma in the workup of the adrenal adenoma  We did discuss doing genetic testing for the [facility] gene  She would be agreeable to this as long as [employer] pays for it  Adrenal adenoma  This has been stable in size since 2010  I have ordered a 24 urine catecholamines and metanephrines  Osteoporosis without current pathological fracture  She sees Rheumatology for this  She is going to have another DEXA scan in December of this year  This should improve as we improve the parathyroid levels         Diagnoses and all orders for this visit:    Hyperparathyroidism ([facility] [address] )  -     Vitamin D 25 hydroxy Clinic Collect  -     TSH, 3rd generation with T4 reflex Clinic Collect  -     T4, free Clinic Collect  -     PTH, intact Clinic Collect  - Albumin [address] retroperitoneal complete; Future    Adenoma of right adrenal gland    Osteoporosis without current pathological fracture, unspecified osteoporosis type    Fatigue, unspecified type  -     TSH, 3rd generation with T4 reflex Clinic Collect  -     T4, free Clinic Collect    Other orders  -     Cholecalciferol (VITAMIN D) 2000 units CAPS; Take 1 capsule by mouth daily  -     Omega-3 Fatty Acids (FISH OIL) 1200 MG CAPS; Take 1 capsule by mouth daily  -     cyanocobalamin 1000 MCG tablet; Take 100 mcg by mouth daily          Subjective:      Patient ID: Sudheer Bledsoe is a 81 y o  female  79-year-old woman with history of hyperparathyroidism requiring two prior surgeries presents for evaluation of hyperparathyroidism  She states that her two prior surgeries were done many years ago  She had a reimplantation of parathyroid gland in her arm after the 2nd surgery  More recently, she was noted to have elevated parathyroid levels in the workup of osteoporosis  She does admit to fatigue and difficulty with recall  She denies any stomach ulcers, difficulty with blood pressure, constipation and diarrhea  There is a extensive family history of hyperparathyroidism going back to her father  Her son, niece and nephew and sister have had hyperparathyroidism  The following portions of the patient's history were reviewed and updated as appropriate: allergies, current medications, past family history, past medical history, past social history, past surgical history and problem list     Review of Systems   Constitutional: Negative for chills and fever  Respiratory: Negative for shortness of breath  Cardiovascular: Negative for chest pain  Gastrointestinal: Negative for constipation, diarrhea, nausea and vomiting  Endocrine: Negative for polydipsia and polyuria     All other systems reviewed and are negative  Objective:      /66   Pulse 74   Ht 5' (1 524 m)   Wt 66 7 kg (147 lb)   BMI 28 71 kg/m²           Physical Exam   Constitutional: She is oriented to person, place, and time  She appears well-developed and well-nourished  No distress  HENT:   Head: Normocephalic and atraumatic  Mouth/Throat: Oropharynx is clear and moist and mucous membranes are normal  No oropharyngeal exudate  Eyes: Conjunctivae, EOM and lids are normal  Right eye exhibits no discharge  Left eye exhibits no discharge  No scleral icterus  Neck: Neck supple  No thyromegaly present  Cardiovascular: Normal rate, regular rhythm and normal heart sounds  Exam reveals no gallop and no friction rub  No murmur heard  Pulmonary/Chest: Effort normal and breath sounds normal  No respiratory distress  She has no wheezes  Abdominal: Soft  Bowel sounds are normal  She exhibits no distension  There is no tenderness  Musculoskeletal: Normal range of motion  She exhibits no edema, tenderness or deformity  Lymphadenopathy:        Head (right side): No occipital adenopathy present  Head (left side): No occipital adenopathy present  Right: No supraclavicular adenopathy present  Left: No supraclavicular adenopathy present  Neurological: She is alert and oriented to person, place, and time  No cranial nerve deficit  Skin: Skin is warm and intact  No rash noted  She is not diaphoretic  No erythema  Psychiatric: She has a normal mood and affect  Her behavior is normal    Vitals reviewed

## 2018-07-17 NOTE — PROGRESS NOTES
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 syndrome or MEN-1  She did have a parathyroid level in January of greater than 800  Today, I have ordered another parathyroid level, calcium, albumin, phosphorus and magnesium  I have also ordered an ultrasound of the kidneys to rule out nephrocalcinosis  Her sestamibi scan does show a residual parathyroid gland in the left neck  If the repeat testing does show elevated PTH along with high calcium or high normal calcium, I think it would be reasonable to proceed with surgical intervention as long as there is no evidence of pheochromocytoma in the workup of the adrenal adenoma  We did discuss doing genetic testing for the [facility] gene  She would be agreeable to this as long as [employer] pays for it  Adrenal adenoma  This has been stable in size since 2010  I have ordered a 24 urine catecholamines and metanephrines  Osteoporosis without current pathological fracture  She sees Rheumatology for this  She is going to have another DEXA scan in December of this year  This should improve as we improve the parathyroid levels  Diagnoses and all orders for this visit:    Hyperparathyroidism ([facility] )  -     Vitamin D 25 hydroxy Clinic Collect  -     TSH, 3rd generation with T4 reflex Clinic Collect  -     T4, free Clinic Collect  -     PTH, intact [address] retroperitoneal complete; Future    Adenoma of right adrenal gland    Osteoporosis without current pathological fracture, unspecified osteoporosis type    Fatigue, unspecified type  -     TSH, 3rd generation with T4 reflex Clinic Collect  -     T4, free Clinic Collect    Other orders  -     Cholecalciferol (VITAMIN D) 2000 units CAPS;  Take 1 capsule by mouth daily  -     Omega-3 Fatty Acids (FISH OIL) 1200 MG CAPS; Take 1 capsule by mouth daily  -     cyanocobalamin 1000 MCG tablet; Take 100 mcg by mouth daily          Subjective:      Patient ID: Haider Giles is a 81 y o  female  68-year-old woman with history of hyperparathyroidism requiring two prior surgeries presents for evaluation of hyperparathyroidism  She states that her two prior surgeries were done many years ago  She had a reimplantation of parathyroid gland in her arm after the 2nd surgery  More recently, she was noted to have elevated parathyroid levels in the workup of osteoporosis  She does admit to fatigue and difficulty with recall  She denies any stomach ulcers, difficulty with blood pressure, constipation and diarrhea  There is a extensive family history of hyperparathyroidism going back to her father  Her son, niece and nephew and sister have had hyperparathyroidism  The following portions of the patient's history were reviewed and updated as appropriate: allergies, current medications, past family history, past medical history, past social history, past surgical history and problem list     Review of Systems   Constitutional: Negative for chills and fever  Respiratory: Negative for shortness of breath  Cardiovascular: Negative for chest pain  Gastrointestinal: Negative for constipation, diarrhea, nausea and vomiting  Endocrine: Negative for polydipsia and polyuria  All other systems reviewed and are negative  Objective:      /66   Pulse 74   Ht 5' (1 524 m)   Wt 66 7 kg (147 lb)   BMI 28 71 kg/m²          Physical Exam   Constitutional: She is oriented to person, place, and time  She appears well-developed and well-nourished  No distress  HENT:   Head: Normocephalic and atraumatic  Mouth/Throat: Oropharynx is clear and moist and mucous membranes are normal  No oropharyngeal exudate  Eyes: Conjunctivae, EOM and lids are normal  Right eye exhibits no discharge  Left eye exhibits no discharge   No scleral icterus  Neck: Neck supple  No thyromegaly present  Cardiovascular: Normal rate, regular rhythm and normal heart sounds  Exam reveals no gallop and no friction rub  No murmur heard  Pulmonary/Chest: Effort normal and breath sounds normal  No respiratory distress  She has no wheezes  Abdominal: Soft  Bowel sounds are normal  She exhibits no distension  There is no tenderness  Musculoskeletal: Normal range of motion  She exhibits no edema, tenderness or deformity  Lymphadenopathy:        Head (right side): No occipital adenopathy present  Head (left side): No occipital adenopathy present  Right: No supraclavicular adenopathy present  Left: No supraclavicular adenopathy present  Neurological: She is alert and oriented to person, place, and time  No cranial nerve deficit  Skin: Skin is warm and intact  No rash noted  She is not diaphoretic  No erythema  Psychiatric: She has a normal mood and affect  Her behavior is normal    Vitals reviewed

## 2018-07-17 NOTE — ASSESSMENT & PLAN NOTE
This has been stable in size since 2010  I have ordered a 24 urine catecholamines and metanephrines

## 2018-07-17 NOTE — ASSESSMENT & PLAN NOTE
She likely has either familial hyperparathyroidism syndrome or MEN-1  She did have a parathyroid level in January of greater than 800  Today, I have ordered another parathyroid level, calcium, albumin, phosphorus and magnesium  I have also ordered an ultrasound of the kidneys to rule out nephrocalcinosis  Her sestamibi scan does show a residual parathyroid gland in the left neck  If the repeat testing does show elevated PTH along with high calcium or high normal calcium, I think it would be reasonable to proceed with surgical intervention as long as there is no evidence of pheochromocytoma in the workup of the adrenal adenoma  We did discuss doing genetic testing for the [facility] gene  She would be agreeable to this as long as [employer] pays for it

## 2018-07-17 NOTE — ASSESSMENT & PLAN NOTE
She sees Rheumatology for this  She is going to have another DEXA scan in December of this year  This should improve as we improve the parathyroid levels

## 2018-07-18 ENCOUNTER — TELEPHONE (OUTPATIENT)
Dept: ENDOCRINOLOGY | Facility: CLINIC | Age: 73
End: 2018-07-18

## 2018-07-18 DIAGNOSIS — E21.3 HYPERPARATHYROIDISM (HCC): Primary | ICD-10-CM

## 2018-07-18 NOTE — TELEPHONE ENCOUNTER
Patient informed of lab results    The parathyroid level is elevated but the calcium level was normal  The vitamin-D level was normal  Normal thyroid testing   I wonder if part of her increased PTH is due to low calcium intake   Would recommend increasing dietary calcium 212 100 mg per day   In three months, repeat calcium, albumin and intact PTH

## 2018-07-18 NOTE — TELEPHONE ENCOUNTER
----- Message from Marie Moreno MD sent at 7/18/2018  8:58 AM EDT -----  The parathyroid level is elevated but the calcium level was normal  The vitamin-D level was normal  Normal thyroid testing  I wonder if part of her increased PTH is due to low calcium intake  Would recommend increasing dietary calcium 212 100 mg per day  In three months, repeat calcium, albumin and intact PTH

## 2018-07-18 NOTE — PROGRESS NOTES
The parathyroid level is elevated but the calcium level was normal  The vitamin-D level was normal  Normal thyroid testing  I wonder if part of her increased PTH is due to low calcium intake  Would recommend increasing dietary calcium 212 100 mg per day  In three months, repeat calcium, albumin and intact PTH

## 2018-07-18 NOTE — TELEPHONE ENCOUNTER
----- Message from Anitha Angeles MD sent at 7/18/2018  8:58 AM EDT -----  The parathyroid level is elevated but the calcium level was normal  The vitamin-D level was normal  Normal thyroid testing  I wonder if part of her increased PTH is due to low calcium intake  Would recommend increasing dietary calcium 212 100 mg per day  In three months, repeat calcium, albumin and intact PTH

## 2018-07-19 ENCOUNTER — HOSPITAL ENCOUNTER (OUTPATIENT)
Dept: ULTRASOUND IMAGING | Facility: HOSPITAL | Age: 73
Discharge: HOME | End: 2018-07-19
Attending: INTERNAL MEDICINE
Payer: MEDICARE

## 2018-07-19 DIAGNOSIS — E21.3 HYPERPARATHYROIDISM (HCC): ICD-10-CM

## 2018-07-19 PROCEDURE — 76770 US EXAM ABDO BACK WALL COMP: CPT

## 2018-07-24 ENCOUNTER — TELEPHONE (OUTPATIENT)
Dept: ENDOCRINOLOGY | Facility: CLINIC | Age: 73
End: 2018-07-24

## 2018-07-24 NOTE — TELEPHONE ENCOUNTER
----- Message from Maciej De Los Santos MD sent at 7/24/2018 10:57 AM EDT -----  Please call the patient regarding her abnormal result  There is increased echogenicity which may be due to medical renal disease  Continue to monitor for now      Sent to my chart

## 2018-07-24 NOTE — TELEPHONE ENCOUNTER
Called Medicare to find out if [facility] gene (code 31413) would be covered  Was advised that it is billable, could tell me if the procedure would be paid for only that it is billable  Called patient and advised the above, she advised she would think about having it done and call back when she decides because she knows the procedure costs thousands of dollars and she does not want to get hit with a huge bill after the fact

## 2018-07-24 NOTE — PROGRESS NOTES
Please call the patient regarding her abnormal result  There is increased echogenicity which may be due to medical renal disease  Continue to monitor for now      Sent to my chart

## 2018-07-31 ENCOUNTER — LAB (OUTPATIENT)
Dept: LAB | Facility: CLINIC | Age: 73
End: 2018-07-31
Payer: MEDICARE

## 2018-07-31 DIAGNOSIS — D35.00 ADRENAL ADENOMA, UNSPECIFIED LATERALITY: ICD-10-CM

## 2018-07-31 DIAGNOSIS — M81.0 OSTEOPOROSIS WITHOUT CURRENT PATHOLOGICAL FRACTURE, UNSPECIFIED OSTEOPOROSIS TYPE: ICD-10-CM

## 2018-07-31 DIAGNOSIS — E21.3 HYPERPARATHYROIDISM (HCC): ICD-10-CM

## 2018-07-31 PROCEDURE — 83835 ASSAY OF METANEPHRINES: CPT

## 2018-07-31 PROCEDURE — 82384 ASSAY THREE CATECHOLAMINES: CPT

## 2018-07-31 PROCEDURE — 82340 ASSAY OF CALCIUM IN URINE: CPT

## 2018-08-01 LAB
CALCIUM 24H UR-MCNC: 24 MG/24 HRS (ref 42–353)
SPECIMEN VOL UR: 300 ML

## 2018-08-02 ENCOUNTER — TELEPHONE (OUTPATIENT)
Dept: ENDOCRINOLOGY | Facility: CLINIC | Age: 73
End: 2018-08-02

## 2018-08-03 LAB
METANEPH 24H UR-MRATE: 47 UG/24 HR (ref 45–290)
METANEPHS 24H UR-MCNC: 156 UG/L
NORMETANEPHRINE 24H UR-MCNC: 214 UG/L
NORMETANEPHRINE 24H UR-MRATE: 64 UG/24 HR (ref 82–500)

## 2018-08-04 LAB
DOPAMINE 24H UR-MRATE: 34 UG/24 HR (ref 0–510)
DOPAMINE UR-MCNC: 113 UG/L
EPINEPH 24H UR-MRATE: 1 UG/24 HR (ref 0–20)
EPINEPH UR-MCNC: 3 UG/L
NOREPINEPH 24H UR-MRATE: 6 UG/24 HR (ref 0–135)
NOREPINEPH UR-MCNC: 21 UG/L

## 2018-09-24 ENCOUNTER — TELEPHONE (OUTPATIENT)
Dept: ENDOCRINOLOGY | Facility: CLINIC | Age: 73
End: 2018-09-24

## 2018-09-24 NOTE — TELEPHONE ENCOUNTER
----- Message from Ashlie Irving MD sent at 9/24/2018  7:46 AM EDT -----  No evidence of TB  Urinalysis showed possibility of a urinary tract infection  Follow-up with primary care physician      Sent to my chart

## 2018-09-24 NOTE — PROGRESS NOTES
No evidence of TB  Urinalysis showed possibility of a urinary tract infection  Follow-up with primary care physician      Sent to my chart

## 2018-10-24 RX ORDER — DOXYCYCLINE HYCLATE 100 MG
TABLET ORAL
Refills: 0 | COMMUNITY
Start: 2018-09-20 | End: 2018-10-25

## 2018-10-25 ENCOUNTER — OFFICE VISIT (OUTPATIENT)
Dept: ENDOCRINOLOGY | Facility: CLINIC | Age: 73
End: 2018-10-25
Payer: MEDICARE

## 2018-10-25 VITALS
DIASTOLIC BLOOD PRESSURE: 80 MMHG | WEIGHT: 149.6 LBS | HEART RATE: 81 BPM | SYSTOLIC BLOOD PRESSURE: 130 MMHG | HEIGHT: 60 IN | BODY MASS INDEX: 29.37 KG/M2

## 2018-10-25 DIAGNOSIS — D35.00 ADRENAL ADENOMA, UNSPECIFIED LATERALITY: ICD-10-CM

## 2018-10-25 DIAGNOSIS — E21.3 HYPERPARATHYROIDISM (HCC): Primary | ICD-10-CM

## 2018-10-25 PROCEDURE — 99214 OFFICE O/P EST MOD 30 MIN: CPT | Performed by: INTERNAL MEDICINE

## 2018-10-25 NOTE — ASSESSMENT & PLAN NOTE
She did do a 24 urine collection for catecholamines and metanephrines which is unreliable due to low urine volume  Since she is asymptomatic for pheochromocytoma and has relatively normal blood pressure, I would continue to monitor the size of the adenoma with yearly CT scan  At this time, I do not believe she has pheochromocytoma  Due to the medical renal disease on ultrasound, I have referred her to Nephrology

## 2018-10-25 NOTE — PROGRESS NOTES
Assessment/Plan:    Hyperparathyroidism ([facility] [address] )  Her parathyroid levels have decreased but remain elevated  Her 24 hour urine calcium is low likely due to low urinary volume  Some of the elevation in her parathyroid level may be due to inadequate calcium absorption due to her gastric surgery  I have asked her to take 1200 milligrams of calcium supplementation per day in addition to what she gets in her diet  Repeat parathyroid blood work in about 6 to 8 weeks  At this time, I would not pursue surgery since her calcium levels are normal     Adrenal adenoma  She did do a 24 urine collection for catecholamines and metanephrines which is unreliable due to low urine volume  Since she is asymptomatic for pheochromocytoma and has relatively normal blood pressure, I would continue to monitor the size of the adenoma with yearly CT scan  At this time, I do not believe she has pheochromocytoma  Due to the medical renal disease on ultrasound, I have referred her to Nephrology  Diagnoses and all orders for this visit:    Hyperparathyroidism ([facility] )  -     PTH, intact Lab Collect Lab Collect; Future  -     Comprehensive metabolic panel Lab Collect; Future  -     Ambulatory referral to Nephrology; Future    Adrenal adenoma, unspecified laterality    Other orders  -     Discontinue: doxycycline hyclate (VIBRA-TABS) 100 mg tablet; TAKE 1 TABLET BY MOUTH TWICE A DAY TWICE A DAY          Subjective:      Patient ID: Annie Carver is a 81 y o  female  81 year with history of hyperparathyroidism requiring surgical intervention in the past presents for evaluation elevated parathyroid levels  Her levels have decreased significantly  I suspect some of the elevation in her parathyroid levels are due to low calcium absorption from her gastric surgery  She also collected 24 urine for calcium and it was only 300 milliliters  This probably caused a low urinary calcium  She did not have any symptoms of pheochromocytoma  She denies any headaches and palpitations  On ultrasound of the kidneys, she is noted to have atrophy of the right kidney and medical renal disease of the left kidney  The following portions of the patient's history were reviewed and updated as appropriate: allergies, current medications, past family history, past medical history, past social history, past surgical history and problem list     Review of Systems   Constitutional: Negative for chills and fever  Respiratory: Negative for shortness of breath  Cardiovascular: Negative for chest pain  Gastrointestinal: Negative for constipation, diarrhea, nausea and vomiting  Endocrine: Negative for heat intolerance  All other systems reviewed and are negative  Objective:      /80   Pulse 81   Ht 5' (1 524 m)   Wt 67 9 kg (149 lb 9 6 oz)   BMI 29 22 kg/m²          Physical Exam   Constitutional: She is oriented to person, place, and time  She appears well-developed and well-nourished  No distress  HENT:   Head: Normocephalic and atraumatic  Mouth/Throat: Oropharynx is clear and moist and mucous membranes are normal  No oropharyngeal exudate  Eyes: Conjunctivae, EOM and lids are normal  Right eye exhibits no discharge  Left eye exhibits no discharge  No scleral icterus  Neck: Neck supple  No thyromegaly present  Cardiovascular: Normal rate, regular rhythm and normal heart sounds  Exam reveals no gallop and no friction rub  No murmur heard  Pulmonary/Chest: Effort normal and breath sounds normal  No respiratory distress  She has no wheezes  Abdominal: Soft  Bowel sounds are normal  She exhibits no distension  There is no tenderness  Musculoskeletal: Normal range of motion  She exhibits no edema, tenderness or deformity  Lymphadenopathy:        Head (right side): No occipital adenopathy present  Head (left side): No occipital adenopathy present  Right: No supraclavicular adenopathy present          Left: No supraclavicular adenopathy present  Neurological: She is alert and oriented to person, place, and time  No cranial nerve deficit  Skin: Skin is warm and intact  No rash noted  She is not diaphoretic  No erythema  Psychiatric: She has a normal mood and affect  Her behavior is normal    Vitals reviewed

## 2018-10-25 NOTE — ASSESSMENT & PLAN NOTE
Her parathyroid levels have decreased but remain elevated  Her 24 hour urine calcium is low likely due to low urinary volume  Some of the elevation in her parathyroid level may be due to inadequate calcium absorption due to her gastric surgery  I have asked her to take 1200 milligrams of calcium supplementation per day in addition to what she gets in her diet  Repeat parathyroid blood work in about 6 to 8 weeks    At this time, I would not pursue surgery since her calcium levels are normal

## 2018-10-26 ENCOUNTER — TELEPHONE (OUTPATIENT)
Dept: NEPHROLOGY | Facility: CLINIC | Age: 73
End: 2018-10-26

## 2018-11-20 ENCOUNTER — OFFICE VISIT (OUTPATIENT)
Dept: NEPHROLOGY | Facility: CLINIC | Age: 73
End: 2018-11-20
Payer: MEDICARE

## 2018-11-20 VITALS
DIASTOLIC BLOOD PRESSURE: 78 MMHG | HEIGHT: 60 IN | SYSTOLIC BLOOD PRESSURE: 120 MMHG | BODY MASS INDEX: 29.45 KG/M2 | HEART RATE: 80 BPM | WEIGHT: 150 LBS

## 2018-11-20 DIAGNOSIS — N39.0 URINARY TRACT INFECTION WITHOUT HEMATURIA, SITE UNSPECIFIED: ICD-10-CM

## 2018-11-20 DIAGNOSIS — D35.00 ADRENAL ADENOMA, UNSPECIFIED LATERALITY: ICD-10-CM

## 2018-11-20 DIAGNOSIS — N26.1 ATROPHIC KIDNEY: ICD-10-CM

## 2018-11-20 DIAGNOSIS — E21.3 HYPERPARATHYROIDISM (HCC): Primary | ICD-10-CM

## 2018-11-20 LAB
SL AMB  POCT GLUCOSE, UA: ABNORMAL
SL AMB LEUKOCYTE ESTERASE,UA: ABNORMAL
SL AMB POCT BILIRUBIN,UA: 1
SL AMB POCT BLOOD,UA: ABNORMAL
SL AMB POCT CLARITY,UA: ABNORMAL
SL AMB POCT COLOR,UA: YELLOW
SL AMB POCT KETONES,UA: 5
SL AMB POCT NITRITE,UA: ABNORMAL
SL AMB POCT PH,UA: 5
SL AMB POCT SPECIFIC GRAVITY,UA: 1.02
SL AMB POCT URINE PROTEIN: 30
SL AMB POCT UROBILINOGEN: 0.2

## 2018-11-20 PROCEDURE — 99204 OFFICE O/P NEW MOD 45 MIN: CPT | Performed by: INTERNAL MEDICINE

## 2018-11-20 PROCEDURE — 81002 URINALYSIS NONAUTO W/O SCOPE: CPT | Performed by: INTERNAL MEDICINE

## 2018-11-20 RX ORDER — LANOLIN ALCOHOL/MO/W.PET/CERES
1000 CREAM (GRAM) TOPICAL DAILY
COMMUNITY

## 2018-11-20 NOTE — LETTER
November 20, 2018     [facility] [address]  [address]  [address] [place] [address]    Patient: Jenell Meckel   YOB: 1945   Date of Visit: 11/20/2018       Dear Dr Viviane Gonzales: Thank you for referring Cody Sons to me for evaluation  Below are my notes for this consultation  If you have questions, please do not hesitate to call me  I look forward to following your patient along with you  Sincerely,        Harmeet Collier MD        CC: Beryle Cross, MD Monetta Sloop, MD  11/20/2018  4:02 PM  Sign at close encounter  Consultation - Nephrology   Jenell Meckel 81 y o  female MRN: [id number]  Unit/Bed#:  Encounter: [id number]      Assessment/Plan     Assessment / Plan:  1  Renal    The patient was incidentally found to have an atrophic right kidney that was commented on the ultrasound is stable so it seems has been there for a while her left kidney is normal   Renal function is normal there is no significant other findings presently although she does appear to have bladder infection which could contaminate her urine  Will repeat lab work would for now just continue to monitor as her blood pressure is excellent  2   Hyperparathyroidism    The patient has a very strong family history of hyperparathyroidism  She personally has undergone 2 neck surgeries as far as her understanding was they had removed all of her parathyroid glands and implanted a piece in her left forearm  In this patient alone her father her sister her son 1 nephew and 1 niece all had hyperparathyroidism and she did tell me when asked that the 1 knees had MEN I   I am going to do some research into familial hyperparathyroidism to see if there is any other syndromes other than these multiple endocrine neoplasia syndromes  Of note she did have an adrenal adenoma and 24 hour urine for metanephrines was normal so it did not appear to be functional so it is likely in incidentaloma      One other thing that is curious some going to have her repeat a PTH level from her right arm in case the blood draw was in the left and some the veins draining the parathyroid tissue in the left arm may have made the level higher some just curious to see if it will be normalized  I do not see any indications at the present time for exploratory surgery a will continue to monitor  3   History of nephrolithiasis  Recent ultrasound did not demonstrate any stone presence  History of Present Illness   Physician Requesting Consult: No att  providers found  Reason for Consult / Principal Problem:   Hyperparathyroidism and atrophic kidney  Hx and PE limited by:   HPI: Kelly Diamond is a 81y o  year old female who presents for evaluation  The patient has a strong family history of hyperparathyroidism and having 2 neck surgeries  There was some equivocal finding on a recent nuclear scan as it appears she may have some residual tissue in her left neck and there was no definitive finding in the left forearm although it did appear to be bright to me  She is here for recommendations regarding this as well as commenting on atrophic kidney that was incidentally discovered  History obtained from chart review and the patient  Consults    Review of Systems   Constitutional: Negative  HENT: Negative  Eyes: Negative  No visual complaints  Respiratory: Negative  Cardiovascular: Negative  Gastrointestinal: Negative  Negative for abdominal distention, abdominal pain, nausea and vomiting  Genitourinary: Negative  Negative for dysuria, hematuria and urgency  Skin: Negative for rash  Neurological: Negative  Negative for dizziness, seizures and syncope         Historical Information   Patient Active Problem List   Diagnosis    Hyperparathyroidism ([address] )    Osteoporosis without current pathological fracture    Adrenal adenoma    Atrophic kidney     Past Medical History:   Diagnosis Date    Full dentures     GERD (gastroesophageal reflux disease)     Hiatal hernia     Kidney stone     RA (rheumatoid arthritis) (HCC)     Rheumatoid arthritis (HCC)     Sleep apnea     resolved since gastric sleeve surgery    Wears glasses      Past Surgical History:   Procedure Laterality Date    BARIATRIC SURGERY      CHOLECYSTECTOMY      GANGLION CYST EXCISION Left 12/15/2017    Procedure: 2ND INTERSPACE MORTONS NEUROMA EXCISION;  Surgeon: Ethan Sorensen DPM;  Location: [facility];  Service: Podiatry    HYSTERECTOMY      LITHOTRIPSY      PARATHYROID GLAND SURGERY      x2    SLEEVE GASTROPLASTY      TONSILLECTOMY       Social History   History   Alcohol Use    Yes     Comment: socially     History   Drug Use No     History   Smoking Status    Former Smoker   Smokeless Tobacco    Never Used     Comment: quit over 27 years     Family History   Problem Relation Age of Onset    Hyperparathyroidism Father     Hyperparathyroidism Sister     Hyperparathyroidism Family        Meds/Allergies   current meds:   No current facility-administered medications for this visit  Allergies   Allergen Reactions    Amoxicillin Anaphylaxis    Penicillins Anaphylaxis    Darvon [Propoxyphene]      Generalized numbness    Demerol [Meperidine] Other (See Comments)     Generalized numbness    Nsaids Other (See Comments)     S/p gastric surgery       Objective   [unfilled]  Body mass index is 29 29 kg/m²  Invasive Devices:        PHYSICAL EXAM:  /78 (BP Location: Right arm, Patient Position: Sitting, Cuff Size: Standard)   Pulse 80   Ht 5' (1 524 m)   Wt 68 kg (150 lb)   BMI 29 29 kg/m²      Physical Exam   Constitutional: She is oriented to person, place, and time  She appears well-nourished  No distress  HENT:   Head: Atraumatic  Mouth/Throat: No oropharyngeal exudate  Eyes: Conjunctivae are normal  No scleral icterus  Neck: Neck supple  No JVD present  Cardiovascular: Normal rate and regular rhythm    Exam reveals no friction rub  No significant edema  Pulmonary/Chest: Effort normal and breath sounds normal  No respiratory distress  She has no wheezes  She has no rales  Abdominal: Soft  Bowel sounds are normal  She exhibits no distension  There is no tenderness  There is no rebound  Lymphadenopathy:     She has no cervical adenopathy  Neurological: She is alert and oriented to person, place, and time     Gait normal and nonfocal motor exam          Current Weight: Weight - Scale: 68 kg (150 lb)  First Weight: Weight - Scale: 68 kg (150 lb)    Lab Results:              Invalid input(s): LABGLOM        Invalid input(s): LABALBU

## 2018-11-20 NOTE — PROGRESS NOTES
Consultation - Nephrology   Maurice Turner 81 y o  female MRN: [id number]  Unit/Bed#:  Encounter: [id number]      Assessment/Plan     Assessment / Plan:  1  Renal    The patient was incidentally found to have an atrophic right kidney that was commented on the ultrasound is stable so it seems has been there for a while her left kidney is normal   Renal function is normal there is no significant other findings presently although she does appear to have bladder infection which could contaminate her urine  Will repeat lab work would for now just continue to monitor as her blood pressure is excellent  2   Hyperparathyroidism    The patient has a very strong family history of hyperparathyroidism  She personally has undergone 2 neck surgeries as far as her understanding was they had removed all of her parathyroid glands and implanted a piece in her left forearm  In this patient alone her father her sister her son 1 nephew and 1 niece all had hyperparathyroidism and she did tell me when asked that the 1 knees had MEN I   I am going to do some research into familial hyperparathyroidism to see if there is any other syndromes other than these multiple endocrine neoplasia syndromes  Of note she did have an adrenal adenoma and 24 hour urine for metanephrines was normal so it did not appear to be functional so it is likely in incidentaloma  One other thing that is curious some going to have her repeat a PTH level from her right arm in case the blood draw was in the left and some the veins draining the parathyroid tissue in the left arm may have made the level higher some just curious to see if it will be normalized  I do not see any indications at the present time for exploratory surgery a will continue to monitor  3   History of nephrolithiasis  Recent ultrasound did not demonstrate any stone presence            History of Present Illness   Physician Requesting Consult: No att  providers found  Reason for Consult / Principal Problem:   Hyperparathyroidism and atrophic kidney  Hx and PE limited by:   HPI: Mehul Busch is a 81y o  year old female who presents for evaluation  The patient has a strong family history of hyperparathyroidism and having 2 neck surgeries  There was some equivocal finding on a recent nuclear scan as it appears she may have some residual tissue in her left neck and there was no definitive finding in the left forearm although it did appear to be bright to me  She is here for recommendations regarding this as well as commenting on atrophic kidney that was incidentally discovered  History obtained from chart review and the patient  Consults    Review of Systems   Constitutional: Negative  HENT: Negative  Eyes: Negative  No visual complaints  Respiratory: Negative  Cardiovascular: Negative  Gastrointestinal: Negative  Negative for abdominal distention, abdominal pain, nausea and vomiting  Genitourinary: Negative  Negative for dysuria, hematuria and urgency  Skin: Negative for rash  Neurological: Negative  Negative for dizziness, seizures and syncope         Historical Information   Patient Active Problem List   Diagnosis    Hyperparathyroidism ([address] )    Osteoporosis without current pathological fracture    Adrenal adenoma    Atrophic kidney     Past Medical History:   Diagnosis Date    Full dentures     GERD (gastroesophageal reflux disease)     Hiatal hernia     Kidney stone     RA (rheumatoid arthritis) ([address] )     Rheumatoid arthritis ([address] )     Sleep apnea     resolved since gastric sleeve surgery    Wears glasses      Past Surgical History:   Procedure Laterality Date    BARIATRIC SURGERY      CHOLECYSTECTOMY      GANGLION CYST EXCISION Left 12/15/2017    Procedure: 2ND INTERSPACE MORTONS NEUROMA EXCISION;  Surgeon: Nissa Heath DPM;  Location: [facility];  Service: Podiatry    HYSTERECTOMY      LITHOTRIPSY      PARATHYROID GLAND SURGERY      x2  SLEEVE GASTROPLASTY      TONSILLECTOMY       Social History   History   Alcohol Use    Yes     Comment: socially     History   Drug Use No     History   Smoking Status    Former Smoker   Smokeless Tobacco    Never Used     Comment: quit over 27 years     Family History   Problem Relation Age of Onset    Hyperparathyroidism Father     Hyperparathyroidism Sister     Hyperparathyroidism Family        Meds/Allergies   current meds:   No current facility-administered medications for this visit  Allergies   Allergen Reactions    Amoxicillin Anaphylaxis    Penicillins Anaphylaxis    Darvon [Propoxyphene]      Generalized numbness    Demerol [Meperidine] Other (See Comments)     Generalized numbness    Nsaids Other (See Comments)     S/p gastric surgery       Objective   [unfilled]  Body mass index is 29 29 kg/m²  Invasive Devices:        PHYSICAL EXAM:  /78 (BP Location: Right arm, Patient Position: Sitting, Cuff Size: Standard)   Pulse 80   Ht 5' (1 524 m)   Wt 68 kg (150 lb)   BMI 29 29 kg/m²     Physical Exam   Constitutional: She is oriented to person, place, and time  She appears well-nourished  No distress  HENT:   Head: Atraumatic  Mouth/Throat: No oropharyngeal exudate  Eyes: Conjunctivae are normal  No scleral icterus  Neck: Neck supple  No JVD present  Cardiovascular: Normal rate and regular rhythm  Exam reveals no friction rub  No significant edema  Pulmonary/Chest: Effort normal and breath sounds normal  No respiratory distress  She has no wheezes  She has no rales  Abdominal: Soft  Bowel sounds are normal  She exhibits no distension  There is no tenderness  There is no rebound  Lymphadenopathy:     She has no cervical adenopathy  Neurological: She is alert and oriented to person, place, and time     Gait normal and nonfocal motor exam          Current Weight: Weight - Scale: 68 kg (150 lb)  First Weight: Weight - Scale: 68 kg (150 lb)    Lab Results: Invalid input(s): LABGLOM        Invalid input(s): LABALBU

## 2018-11-20 NOTE — PATIENT INSTRUCTIONS
This was her 1st visit here for very interesting issues  Your history reveals a very strong family history in you and relatives of hyperparathyroidism  You have told me you had 2 surgeries in your neck and we are under the understanding that all your parathyroids were missing and that you had a small piece in her left forearm  The parathyroid hormone was elevated so there definitely some parathyroid tissue in your body and a parathyroid scan suggested some in your neck and unsure about the activity in the arm  Clinically I do not see any reason to undergo surgery at this time because her calcium is normal the other factors that would be indications for surgery do not seem to be present  It might be possible that you had blood drawn from vein that was proximal to your parathyroid tissue in your arm some going to have a repeat the level in your other arm  I am just going to also look into potential syndrome is a familial hyperparathyroidism other then the MEN syndromes which we discussed  I am going to get the pathology report as well just for my information  With respect your kidneys you discovered incidentally that 1 of your kidneys is smaller than the other but the blood test your kidney function is normal and there is no signs of kidney disease so at this point it can just be observed and it likely is not related to the other issues  Obtain blood work but let them use your right arm  I will contact you with results after I reviewed your records and receive the blood test and then will determine what sort of follow-up is needed

## 2018-11-21 ENCOUNTER — TELEPHONE (OUTPATIENT)
Dept: NEPHROLOGY | Facility: CLINIC | Age: 73
End: 2018-11-21

## 2018-11-21 NOTE — TELEPHONE ENCOUNTER
Called   [facility] medical records department and faxed them a record request neck biopsy from 10 years ago

## 2018-11-21 NOTE — TELEPHONE ENCOUNTER
----- Message from Rayshawn Bryant MD sent at 11/21/2018  7:43 AM EST -----  Call pathology  She thinks it was Dr Josue Lamas who is retired  ----- Message -----  From: Marylu Helms  Sent: 11/20/2018   4:16 PM  To: Rayshawn Bryant MD     Records do not go back that far and they do not have records in [address] [place] [address] of this  As far as surgery 10 years ago at [facility] any idea how ordered the biopsy and or preformed the surgery?  ----- Message -----  From: Rayshawn Bryant MD  Sent: 11/20/2018   4:03 PM  To: Dimple Kay    1  Please contact both [address] [place] [address] pathology as this patient said she had neck surgery back in the 1980s it may not be there  I would like the pathology from that  Also [facility] maybe about 10 years ago she had neck surgery there is well I would like that pathology report to both of these of looking and pathology for the report of the tissue done on the surgeries in the neck

## 2018-12-20 ENCOUNTER — TRANSCRIBE ORDERS (OUTPATIENT)
Dept: ADMINISTRATIVE | Facility: HOSPITAL | Age: 73
End: 2018-12-20

## 2018-12-20 ENCOUNTER — HOSPITAL ENCOUNTER (OUTPATIENT)
Dept: RADIOLOGY | Facility: HOSPITAL | Age: 73
Discharge: HOME | End: 2018-12-20
Attending: PODIATRIST
Payer: MEDICARE

## 2018-12-20 DIAGNOSIS — M79.671 RIGHT FOOT PAIN: Primary | ICD-10-CM

## 2018-12-20 DIAGNOSIS — M79.671 RIGHT FOOT PAIN: ICD-10-CM

## 2018-12-20 PROCEDURE — 73630 X-RAY EXAM OF FOOT: CPT

## 2019-01-02 ENCOUNTER — TRANSCRIBE ORDERS (OUTPATIENT)
Dept: ADMINISTRATIVE | Facility: HOSPITAL | Age: 74
End: 2019-01-02

## 2019-01-02 DIAGNOSIS — M77.9 TENDONITIS: Primary | ICD-10-CM

## 2019-01-04 ENCOUNTER — HOSPITAL ENCOUNTER (OUTPATIENT)
Dept: RADIOLOGY | Age: 74
Discharge: HOME | End: 2019-01-04
Payer: MEDICARE

## 2019-01-04 DIAGNOSIS — M77.9 TENDONITIS: ICD-10-CM

## 2019-01-04 PROCEDURE — 73721 MRI JNT OF LWR EXTRE W/O DYE: CPT

## 2019-01-08 ENCOUNTER — TRANSCRIBE ORDERS (OUTPATIENT)
Dept: ADMINISTRATIVE | Facility: HOSPITAL | Age: 74
End: 2019-01-08

## 2019-01-08 DIAGNOSIS — M05.79 SEROPOSITIVE RHEUMATOID ARTHRITIS OF MULTIPLE SITES (HCC): Primary | ICD-10-CM

## 2019-01-15 ENCOUNTER — HOSPITAL ENCOUNTER (OUTPATIENT)
Dept: RADIOLOGY | Age: 74
Discharge: HOME | End: 2019-01-15
Payer: MEDICARE

## 2019-01-15 DIAGNOSIS — E21.0 PRIMARY HYPERPARATHYROIDISM (HCC): ICD-10-CM

## 2019-01-15 PROCEDURE — 77080 DXA BONE DENSITY AXIAL: CPT

## 2019-03-15 ENCOUNTER — OFFICE VISIT (OUTPATIENT)
Dept: ENDOCRINOLOGY | Facility: CLINIC | Age: 74
End: 2019-03-15
Payer: MEDICARE

## 2019-03-15 VITALS
WEIGHT: 150 LBS | HEIGHT: 60 IN | SYSTOLIC BLOOD PRESSURE: 122 MMHG | BODY MASS INDEX: 29.45 KG/M2 | HEART RATE: 75 BPM | DIASTOLIC BLOOD PRESSURE: 76 MMHG

## 2019-03-15 DIAGNOSIS — M81.0 OSTEOPOROSIS WITHOUT CURRENT PATHOLOGICAL FRACTURE, UNSPECIFIED OSTEOPOROSIS TYPE: ICD-10-CM

## 2019-03-15 DIAGNOSIS — D35.01 ADENOMA OF RIGHT ADRENAL GLAND: ICD-10-CM

## 2019-03-15 DIAGNOSIS — E21.3 HYPERPARATHYROIDISM (HCC): Primary | ICD-10-CM

## 2019-03-15 PROCEDURE — 99214 OFFICE O/P EST MOD 30 MIN: CPT | Performed by: NURSE PRACTITIONER

## 2019-03-15 NOTE — PROGRESS NOTES
Established Patient Progress Note       Chief Complaint   Patient presents with    Follow-up        History of Present Illness:     Kelly Diamond is a 72 y o  female with a history of hyperparathyroidism, adrenal adenoma, osteoporosis  For the hyperparathyroidism she underwent two parathyroidectomies many years ago  After her second surgery she had parathyroid gland reimplanted into her left forearm  She does have a strong family history of hyperparathyroidism  Her father, son, niece, nephew, and sister have had hyperparathyroidism  Niece was also diagnosed with MEN  For the adrenal adenoma her hormonal workup has been negative  For the osteoporosis she is currently taking calcium and vitamin d supplementation  She denies any recent falls or fractures           Patient Active Problem List   Diagnosis    Hyperparathyroidism ([address] )    Osteoporosis without current pathological fracture    Adenoma of right adrenal gland    Atrophic kidney      Past Medical History:   Diagnosis Date    Full dentures     GERD (gastroesophageal reflux disease)     Hiatal hernia     Kidney stone     [facility]     [facility]     Sleep apnea     resolved since gastric sleeve surgery    Wears glasses       Past Surgical History:   Procedure Laterality Date    BARIATRIC SURGERY      CHOLECYSTECTOMY      GANGLION CYST EXCISION Left 12/15/2017    Procedure: 2ND INTERSPACE MORTONS NEUROMA EXCISION;  Surgeon: Rufino Hebert DPM;  Location: [facility];  Service: Podiatry    HYSTERECTOMY      LITHOTRIPSY      PARATHYROID GLAND SURGERY      x2   [facility] SLEEVE GASTROPLASTY      TONSILLECTOMY        Family History   Problem Relation Age of Onset    Hyperparathyroidism Father     Hyperparathyroidism Sister     Hyperparathyroidism Family      Social History     Tobacco Use    Smoking status: Former Smoker    Smokeless tobacco: Never Used    Tobacco comment: quit over 30 years   Substance Use Topics    Alcohol use: Yes     Comment: socially     Allergies   Allergen Reactions    Amoxicillin Anaphylaxis    Penicillins Anaphylaxis    Darvon [Propoxyphene]      Generalized numbness    Demerol [Meperidine] Other (See Comments)     Generalized numbness    Nsaids Other (See Comments)     S/p gastric surgery       Current Outpatient Medications:     BIOTIN PO, Take 1 tablet by mouth daily, Disp: , Rfl:     Calcium Carbonate (CALCIUM 600 PO), Take 1 tablet by mouth daily, Disp: , Rfl:     Cholecalciferol (VITAMIN D) 2000 units CAPS, Take 1 capsule by mouth daily, Disp: , Rfl:     citalopram (CeleXA) 20 mg tablet, Take 20 mg by mouth daily  , Disp: , Rfl:     cyanocobalamin (VITAMIN B-12) 1,000 mcg tablet, Take 1,000 mcg by mouth daily, Disp: , Rfl:     hydroxychloroquine (PLAQUENIL) 200 mg tablet, Take 200 mg by mouth daily  , Disp: , Rfl:     Multiple Vitamin (MULTIVITAMIN) capsule, Take 1 capsule by mouth daily, Disp: , Rfl:     Omega-3 Fatty Acids (FISH OIL) 1200 MG CAPS, Take 1 capsule by mouth daily, Disp: , Rfl:     omeprazole (PriLOSEC) 20 mg delayed release capsule, Take 20 mg by mouth 2 (two) times a day  , Disp: , Rfl:     Review of Systems   Constitutional: Negative for chills and fever  HENT: Negative for sore throat and trouble swallowing  Eyes: Negative for visual disturbance  Respiratory: Negative for shortness of breath  Cardiovascular: Negative for chest pain and palpitations  Gastrointestinal: Negative for abdominal pain, constipation and diarrhea  Endocrine: Negative for cold intolerance, heat intolerance, polydipsia, polyphagia and polyuria  Genitourinary: Negative for frequency  Musculoskeletal: Negative for arthralgias and myalgias  Skin: Negative for rash  Neurological: Negative for dizziness and tremors  Hematological: Negative for adenopathy  Psychiatric/Behavioral: Negative for sleep disturbance     All other systems reviewed and are negative  Physical Exam:  Body mass index is 29 29 kg/m²  /76   Pulse 75   Ht 5' (1 524 m)   Wt 68 kg (150 lb)   BMI 29 29 kg/m²    Wt Readings from Last 3 Encounters:   03/15/19 68 kg (150 lb)   11/20/18 68 kg (150 lb)   10/25/18 67 9 kg (149 lb 9 6 oz)       Physical Exam   Constitutional: She is oriented to person, place, and time  She appears well-developed and well-nourished  No distress  HENT:   Head: Normocephalic and atraumatic  Eyes: Pupils are equal, round, and reactive to light  Conjunctivae are normal    Neck: Normal range of motion  Neck supple  No thyromegaly present  Cardiovascular: Normal rate, regular rhythm and normal heart sounds  Pulmonary/Chest: Effort normal and breath sounds normal  No respiratory distress  She has no wheezes  She has no rales  Abdominal: Soft  Bowel sounds are normal  She exhibits no distension  There is no tenderness  Musculoskeletal: Normal range of motion  She exhibits no edema  Neurological: She is alert and oriented to person, place, and time  Skin: Skin is warm and dry  Psychiatric: She has a normal mood and affect  Vitals reviewed  Labs:     11/21/18    PTH 61 9, calcium 9 3    Impression & Plan:    Problem List Items Addressed This Visit        Endocrine    Hyperparathyroidism ([facility] ) - Primary     PTH has normalized with normal calcium level  Patient reports first lab draw was taken from left arm (had parathyroid gland implanted into left forearm) and second was taken from right arm  Will repeat blood work prior to next visit, PTH, calcium, albumin, vitamin d, and phosphorus  Ensure it is drawn from right arm  Relevant Orders    PTH, intact Lab Collect Lab Collect    Vitamin D 25 hydroxy Lab Collect    Albumin Lab Collect    Calcium Lab Collect    Phosphorus Lab Collect    Adenoma of right adrenal gland     Hormonal workup has been negative  CT scan ordered to monitor size of adenoma            Relevant Orders    CT abdomen w wo contrast       Musculoskeletal and Integument    Osteoporosis without current pathological fracture     Managed by Rheumatology  Continue calcium and vitamin d supplementation  Educated on falls prevention and importance of weight bearing exercise  Orders Placed This Encounter   Procedures    CT abdomen w wo contrast     Standing Status:   Future     Standing Expiration Date:   3/15/2023     Scheduling Instructions: The patient will need to drink barium for this test   Barium needs to be picked up in the registration area at least one day prior to your study  AM Appointment: Drink one bottle of barium before bed time the evening before your scheduled test   Drink 1/      2 of the second bottle one hour prior to your test   Please bring other 1/2 bottle with you to drink at the time of your study  PM Appointment:  Drink one bottle of barium before 9:00am on the day of your test   Drink 1/2 of the second bottle one hour      prior to your test   Please bring other 1/2 bottle with you to drink at the time of your study  Nothing to eat 3 hours prior to your test   In addition to the barium, clear liquids are also permitted up until the time of the scan  Clear liquids      includes water, black coffee or tea, apple juice or clear broth  If possible wear clothing without any metal in the abdomen area  Sweat suit, sports, sports bra or bra without underwire may eliminate the need to change  Please bring your physician order,      insurance cards, a form of photo ID and a list of your medications with you  Arrive 15 minutes prior to your appointment time in order to register  On the day of your test, please bring any prior CT or MRI studies of this area with you that were not      performed at a [facility]  To schedule appointment please call Central Scheduling at [phone]  Order Specific Question:   What is the patient's sedation requirement? Answer:    No Sedation    PTH, intact Lab Collect Lab Collect     Standing Status:   Future     Standing Expiration Date:   3/15/2020    Vitamin D 25 hydroxy Lab Collect     Standing Status:   Future     Standing Expiration Date:   3/15/2020    Albumin Lab Collect     Standing Status:   Future     Standing Expiration Date:   3/15/2020    Calcium Lab Collect     Standing Status:   Future     Standing Expiration Date:   3/15/2020    Phosphorus Lab Collect     Standing Status:   Future     Standing Expiration Date:   3/15/2020         Discussed with the patient and all questioned fully answered  She will call me if any problems arise  Follow-up appointment in 6 months       Counseled patient on diagnostic results, prognosis, risk and benefit of treatment options, instruction for management, importance of treatment compliance, Risk  factor reduction and impressions      Deacon Soares [address]

## 2019-03-18 NOTE — ASSESSMENT & PLAN NOTE
PTH has normalized with normal calcium level  Patient reports first lab draw was taken from left arm (had parathyroid gland implanted into left forearm) and second was taken from right arm  Will repeat blood work prior to next visit, PTH, calcium, albumin, vitamin d, and phosphorus  Ensure it is drawn from right arm

## 2019-03-18 NOTE — ASSESSMENT & PLAN NOTE
Managed by Rheumatology  Continue calcium and vitamin d supplementation  Educated on falls prevention and importance of weight bearing exercise

## 2019-05-14 ENCOUNTER — HOSPITAL ENCOUNTER (OUTPATIENT)
Dept: RADIOLOGY | Facility: HOSPITAL | Age: 74
Discharge: HOME | End: 2019-05-14
Payer: MEDICARE

## 2019-05-14 DIAGNOSIS — M05.79 SEROPOSITIVE RHEUMATOID ARTHRITIS OF MULTIPLE SITES (HCC): ICD-10-CM

## 2019-05-14 PROCEDURE — 76881 US COMPL JOINT R-T W/IMG: CPT

## 2019-06-13 ENCOUNTER — HOSPITAL ENCOUNTER (OUTPATIENT)
Dept: CT IMAGING | Facility: HOSPITAL | Age: 74
Discharge: HOME | End: 2019-06-13
Payer: MEDICARE

## 2019-06-13 DIAGNOSIS — D35.01 ADENOMA OF RIGHT ADRENAL GLAND: ICD-10-CM

## 2019-06-13 PROCEDURE — 74150 CT ABDOMEN W/O CONTRAST: CPT

## 2019-06-18 ENCOUNTER — TELEPHONE (OUTPATIENT)
Dept: ENDOCRINOLOGY | Facility: CLINIC | Age: 74
End: 2019-06-18

## 2019-10-22 NOTE — PROGRESS NOTES
Established Patient Progress Note       Chief Complaint   Patient presents with    Follow-up        History of Present Illness:     Chu Aguilar is a 72 y o  female with a history of hyperparathyroidism, adrenal adenoma, osteoporosis  For the hyperparathyroidism she underwent two parathyroidectomies many years ago  After her second surgery she had parathyroid gland reimplanted into her left forearm  She does have a strong family history of hyperparathyroidism  Her father, son, niece, nephew, and sister have had hyperparathyroidism  Niece was also diagnosed with MEN  For the adrenal adenoma her hormonal workup has been negative  Recent CT showed stable adenoma  For the osteoporosis she is currently taking calcium and vitamin d supplementation  She follows with rheumatology  She denies any recent falls or fractures          Patient Active Problem List   Diagnosis    [facility] ([facility]    Osteoporosis without current pathological fracture    Adenoma of right adrenal gland    Atrophic kidney      Past Medical History:   Diagnosis Date    Full dentures     GERD (gastroesophageal reflux disease)     Hiatal hernia     Kidney stone     [facility]) [facility]     Rheumatoid arthritis ([facility]     Sleep apnea     resolved since gastric sleeve surgery    Wears glasses       Past Surgical History:   Procedure Laterality Date    BARIATRIC SURGERY      CHOLECYSTECTOMY      GANGLION CYST EXCISION Left 12/15/2017    Procedure: 2ND INTERSPACE MORTONS NEUROMA EXCISION;  Surgeon: Jeanette Marin DPM;  Location: [facility];  Service: Podiatry    HYSTERECTOMY      LITHOTRIPSY      PARATHYROID GLAND SURGERY      [address] SLEEVE GASTROPLASTY      TONSILLECTOMY        Family History   Problem Relation Age of Onset    Hyperparathyroidism Father     Hyperparathyroidism Sister     Hyperparathyroidism Family      Social History     Tobacco Use    Smoking status: Former Smoker    Smokeless tobacco: Never Used    Tobacco comment: quit over 30 years   Substance Use Topics    Alcohol use: Yes     Comment: socially     Allergies   Allergen Reactions    Amoxicillin Anaphylaxis    Penicillins Anaphylaxis    Darvon [Propoxyphene]      Generalized numbness    Demerol [Meperidine] Other (See Comments)     Generalized numbness    Nsaids Other (See Comments)     S/p gastric surgery       Current Outpatient Medications:     BIOTIN PO, Take 1 tablet by mouth daily, Disp: , Rfl:     Cholecalciferol (VITAMIN D) 2000 units CAPS, Take 1 capsule by mouth daily, Disp: , Rfl:     citalopram (CeleXA) 20 mg tablet, Take 20 mg by mouth daily  , Disp: , Rfl:     cyanocobalamin (VITAMIN B-12) 1,000 mcg tablet, Take 1,000 mcg by mouth daily, Disp: , Rfl:     hydroxychloroquine (PLAQUENIL) 200 mg tablet, Take 200 mg by mouth daily  , Disp: , Rfl:     Multiple Vitamin (MULTIVITAMIN) capsule, Take 1 capsule by mouth daily, Disp: , Rfl:     omeprazole (PriLOSEC) 20 mg delayed release capsule, Take 20 mg by mouth 2 (two) times a day  , Disp: , Rfl:     Calcium Carbonate (CALCIUM 600 PO), Take 1 tablet by mouth daily, Disp: , Rfl:     Omega-3 Fatty Acids (FISH OIL) 1200 MG CAPS, Take 1 capsule by mouth daily, Disp: , Rfl:     Review of Systems   Constitutional: Negative for chills and fever  HENT: Negative for sore throat and trouble swallowing  Eyes: Negative for visual disturbance  Respiratory: Negative for shortness of breath  Cardiovascular: Negative for chest pain and palpitations  Gastrointestinal: Negative for abdominal pain, constipation and diarrhea  Endocrine: Negative for cold intolerance, heat intolerance, polydipsia, polyphagia and polyuria  Genitourinary: Negative for frequency  Musculoskeletal: Negative for arthralgias and myalgias  Skin: Negative for rash  Neurological: Negative for dizziness and tremors  Hematological: Negative for adenopathy     Psychiatric/Behavioral: Negative for sleep disturbance  All other systems reviewed and are negative  Physical Exam:  Body mass index is 28 83 kg/m²  /62   Pulse 78   Ht 5' (1 524 m)   Wt 67 kg (147 lb 9 6 oz)   BMI 28 83 kg/m²    Wt Readings from Last 3 Encounters:   10/23/19 67 kg (147 lb 9 6 oz)   03/15/19 68 kg (150 lb)   11/20/18 68 kg (150 lb)       Physical Exam   Constitutional: She is oriented to person, place, and time  She appears well-developed and well-nourished  No distress  HENT:   Head: Normocephalic and atraumatic  Eyes: Pupils are equal, round, and reactive to light  Conjunctivae are normal    Neck: Normal range of motion  Neck supple  No thyromegaly present  Cardiovascular: Normal rate, regular rhythm and normal heart sounds  Pulmonary/Chest: Effort normal and breath sounds normal  No respiratory distress  She has no wheezes  She has no rales  Abdominal: Soft  Bowel sounds are normal  She exhibits no distension  There is no tenderness  Musculoskeletal: Normal range of motion  She exhibits no edema  Neurological: She is alert and oriented to person, place, and time  Skin: Skin is warm and dry  Psychiatric: She has a normal mood and affect  Vitals reviewed  Labs:     CT - ABDOMEN WITHOUT IV CONTRAST     INDICATION:   D35 01: Benign neoplasm of right adrenal gland  Reassessment of right adrenal adenoma size     COMPARISON:  3/17/2015    ADRENAL GLANDS:  Right adrenal adenoma is identified, measuring approximately 2 2 x 0 9 cm, earlier of similar size  No interval growth  Left adrenal unremarkable  IMPRESSION:  1  Benign right adrenal adenoma, without interval growth since 3/17/2015    DXA SCAN     CLINICAL HISTORY:  72-year-old woman  Menopause at age 38  OTHER RISK FACTORS:  Rheumatoid arthritis  Gastric sleeve surgery  Omeprazole for reflux      TECHNIQUE: Bone densitometry was performed using a Holo[employer]qua[employer] Horizon A bone densitometer  Regions of interest appear properly placed     COMPARISON: There are no prior DXA studies performed on this unit for comparison       RESULTS:      LUMBAR SPINE L1-L3 (L4): BMD  0 912  gm/cm2 / T-score -1 0 / Z score 1 2  (Lumbar levels within parentheses represent vertebrae excluded from analysis due to local structural abnormalities or artifact)      LEFT  TOTAL HIP: BMD 0 655  gm/cm2 / T-score -2 3 / Z score -0 8  LEFT  FEMORAL NECK: BMD 0 542  gm/cm2 / T score -2 8 / Z score -0 9        IMPRESSION:     1  Osteoporosis  [Based on the femoral neck]     2  The 10 year risk of hip fracture is 4 4% with the 10 year risk of major osteoporotic fracture being 16% as calculated by the [facility]/WHO fracture risk assessment tool (FRAX)     3  The current NOF guidelines recommend treating patients with a T-score of -2 5 or less in the lumbar spine or hips, or in post-menopausal women and men over the age of 48 with low bone mass (osteopenia) and a FRAX 10 year risk score of >3% for hip   fracture and/or >20% for major osteoporotic fracture      4  The NOF recommends follow-up DXA in 1-2 years after initiating therapy for osteoporosis and every 2 years thereafter  More frequent evaluation is appropriate for patients with conditions associated with rapid bone loss, such as glucocorticoid   therapy  The interval between DXA screenings may be longer for individuals without major risk factors and initial T-score in the normal or upper low bone mass range      The FRAX tool has not been validated in patients currently or previously treated with pharmacotherapy for osteoporosis  In such patients, clinical judgment must be exercised in interpreting FRAX scores    It is not appropriate to use FRAX to monitor   treatment response         WHO CLASSIFICATION:  Normal (a T-score of -1 0 or higher)  Low bone mineral density (a T-score of less than -1 0 but higher than -2 5)  Osteoporosis (a T-score of -2 5 or less)  Severe osteoporosis (a T-score of -2 5 or less with a fragility fracture)             Impression & Plan:    Problem List Items Addressed This Visit        Endocrine    Hyperparathyroidism ([address] ) - Primary     Is overdue for repeat labs  Did not complete labs ordered at last visit  Have reordered and stressed importance of having this completed  Niece has history of MEN-1  Given her [place] history recommend testing for MENIN  Spoke with [address] [place] [address] in [place] and they have genetic counselor who can walk patient through process  Relevant Orders    Albumin Lab Collect    Calcium Lab Collect    Vitamin D 25 hydroxy Lab Collect    PTH, intact Lab Collect Lab Collect    Phosphorus Lab Collect    PTH, intact Lab Collect Lab Collect    Vitamin D 25 hydroxy Lab Collect    Phosphorus Lab Collect    Albumin Lab Collect    Calcium Lab Collect    Adenoma of right adrenal gland     Hormonal workup has been negative  CT scan was stable  Will be due for repeat CT scan for continued surveillance of adenoma in one year  Musculoskeletal and Integument    Osteoporosis without current pathological fracture     Managed by Rheumatology  Continue calcium and vitamin d supplementation   Educated on falls prevention and importance of weight bearing exercise               Orders Placed This Encounter   Procedures    Albumin Lab Collect    Calcium Lab Collect    Vitamin D 25 hydroxy Lab Collect    PTH, intact Lab Collect Lab Collect    Phosphorus Lab Collect    PTH, intact Lab Collect Lab Collect     Standing Status:   Future     Standing Expiration Date:   10/23/2020    Vitamin D 25 hydroxy Lab Collect     Standing Status:   Future     Standing Expiration Date:   10/23/2020    Phosphorus Lab Collect     Standing Status:   Future     Standing Expiration Date:   10/23/2020    Albumin Lab Collect     Standing Status:   Future     Standing Expiration Date:   10/23/2020    Calcium Lab Collect     Standing Status:   Future     Standing Expiration Date:   10/23/2020       Discussed with the patient and all questioned fully answered  She will call me if any problems arise        Counseled patient on diagnostic results, prognosis, risk and benefit of treatment options, instruction for management, importance of treatment compliance, Risk  factor reduction and impressions      Deacon Soares [address]

## 2019-10-23 ENCOUNTER — OFFICE VISIT (OUTPATIENT)
Dept: ENDOCRINOLOGY | Facility: CLINIC | Age: 74
End: 2019-10-23
Payer: MEDICARE

## 2019-10-23 VITALS
WEIGHT: 147.6 LBS | DIASTOLIC BLOOD PRESSURE: 62 MMHG | BODY MASS INDEX: 28.98 KG/M2 | HEART RATE: 78 BPM | SYSTOLIC BLOOD PRESSURE: 110 MMHG | HEIGHT: 60 IN

## 2019-10-23 DIAGNOSIS — E21.3 HYPERPARATHYROIDISM (HCC): Primary | ICD-10-CM

## 2019-10-23 DIAGNOSIS — M81.0 OSTEOPOROSIS WITHOUT CURRENT PATHOLOGICAL FRACTURE, UNSPECIFIED OSTEOPOROSIS TYPE: ICD-10-CM

## 2019-10-23 DIAGNOSIS — D35.01 ADENOMA OF RIGHT ADRENAL GLAND: ICD-10-CM

## 2019-10-23 PROCEDURE — 99214 OFFICE O/P EST MOD 30 MIN: CPT | Performed by: NURSE PRACTITIONER

## 2019-10-24 NOTE — ASSESSMENT & PLAN NOTE
Hormonal workup has been negative  CT scan was stable  Will be due for repeat CT scan for continued surveillance of adenoma in one year

## 2019-10-24 NOTE — ASSESSMENT & PLAN NOTE
Is overdue for repeat labs  Did not complete labs ordered at last visit  Have reordered and stressed importance of having this completed  Niece has history of MEN-1  Given her [place] history recommend testing for MENIN  Spoke with Usman Negron in [facility] and they have genetic counselor who can walk patient through process

## 2020-01-14 ENCOUNTER — TELEPHONE (OUTPATIENT)
Dept: ENDOCRINOLOGY | Facility: CLINIC | Age: 75
End: 2020-01-14

## 2020-01-14 NOTE — TELEPHONE ENCOUNTER
----- Message from [place] sent at 1/14/2020 12:37 PM EST -----  Please call the patient regarding her abnormal result   Lab results normal

## 2021-02-13 ENCOUNTER — HOSPITAL ENCOUNTER (EMERGENCY)
Facility: HOSPITAL | Age: 76
Discharge: HOME | End: 2021-02-13
Attending: EMERGENCY MEDICINE
Payer: MEDICARE

## 2021-02-13 ENCOUNTER — APPOINTMENT (EMERGENCY)
Dept: RADIOLOGY | Facility: HOSPITAL | Age: 76
End: 2021-02-13
Payer: MEDICARE

## 2021-02-13 VITALS
OXYGEN SATURATION: 100 % | BODY MASS INDEX: 31.26 KG/M2 | RESPIRATION RATE: 18 BRPM | WEIGHT: 160.05 LBS | TEMPERATURE: 97.7 F | DIASTOLIC BLOOD PRESSURE: 74 MMHG | HEART RATE: 70 BPM | SYSTOLIC BLOOD PRESSURE: 144 MMHG

## 2021-02-13 DIAGNOSIS — S92.355A CLOSED NONDISPLACED FRACTURE OF FIFTH METATARSAL BONE OF LEFT FOOT, INITIAL ENCOUNTER: Primary | ICD-10-CM

## 2021-02-13 DIAGNOSIS — S92.155A CLOSED NONDISPLACED AVULSION FRACTURE OF LEFT TALUS, INITIAL ENCOUNTER: ICD-10-CM

## 2021-02-13 PROCEDURE — 73610 X-RAY EXAM OF ANKLE: CPT

## 2021-02-13 PROCEDURE — 99284 EMERGENCY DEPT VISIT MOD MDM: CPT

## 2021-02-13 PROCEDURE — NC001 PR NO CHARGE: Performed by: SURGERY

## 2021-02-13 PROCEDURE — 97163 PT EVAL HIGH COMPLEX 45 MIN: CPT

## 2021-02-13 PROCEDURE — 73630 X-RAY EXAM OF FOOT: CPT

## 2021-02-13 PROCEDURE — 99284 EMERGENCY DEPT VISIT MOD MDM: CPT | Performed by: SURGERY

## 2021-02-13 PROCEDURE — 99284 EMERGENCY DEPT VISIT MOD MDM: CPT | Performed by: EMERGENCY MEDICINE

## 2021-02-13 RX ORDER — OXYCODONE HYDROCHLORIDE AND ACETAMINOPHEN 5; 325 MG/1; MG/1
1 TABLET ORAL EVERY 4 HOURS PRN
Qty: 5 TABLET | Refills: 0 | Status: SHIPPED | OUTPATIENT
Start: 2021-02-13 | End: 2021-02-23

## 2021-02-13 NOTE — PLAN OF CARE
Problem: PHYSICAL THERAPY ADULT  Goal: Performs mobility at highest level of function for planned discharge setting  See evaluation for individualized goals  Description: Treatment/Interventions: Functional transfer training, LE strengthening/ROM, Therapeutic exercise, Endurance training, Patient/family training, Equipment eval/education, Gait training  Equipment Recommended: Other (Comment)(roller walker, left CAM boot)       See flowsheet documentation for full assessment, interventions and recommendations  Outcome: Progressing  Note: Prognosis: Good  Problem List: Decreased strength, Decreased range of motion, Decreased endurance, Impaired balance, Decreased mobility, Decreased safety awareness, Orthopedic restrictions, Pain  Assessment: Pt presents to the emergency department with left ankle swelling and pain  Pt reports  tripping on her shoe and everting her left ankle  Reports feeling immediate pain after the event  Dx: left 5th metatarsal fx and avulsion fx of the dorsal talus  order placed for PT eval and tx  Trauma note states WBAT w/ CAM boot  pt presents w/ comorbidity of RA and personal factors of hearing loss, advanced age and positive fall history  pt presents w/ pain, weakness, decreased ROM, decreased endurance, impaired balance, gait deviations, decreased safety awareness, orthopedic restrictions and fall risk  these impairments are evident in findings from physical examination (weakness and decreased ROM), mobility assessment (need for standby assist w/ all phases of mobility when usually mobilizing independently, tolerance to only 80 feet of ambulation and need for cueing for mobility technique), and Barthel Index: 80/100  pt needed input for mobility technique and safety  pt is at risk for falls due to physical and safety awareness deficits   pt's clinical presentation is unstable/unpredictable (evident in need for standby assist w/ all phases of mobility when usually mobilizing independently, tolerance to only 80 feet of ambulation, pain impacting overall mobility status and need for input for mobility technique/safety)  pt needs inpatient PT tx to improve mobility deficits  discharge recommendation is for outpatient PT to reduce fall risk and maximize level of functional independence (once clearance for participation is provided by Orthopedics upon outpatient follow up)  PT Discharge Recommendation: Home with skilled therapy, Other (Comment)(outpatient PT (upon approval by Orthopedics as outpatient))          See flowsheet documentation for full assessment

## 2021-02-13 NOTE — ED PROVIDER NOTES
History  Chief Complaint   Patient presents with    Ankle Injury     fell getting out of chair; left ankle injury; denies thinners     HPI     70-year-old female presenting to the emergency department with left ankle swelling and pain after tripping on her shoe and everting her left ankle  Past medical history is significant for rheumatoid arthritis  Patient states that she felt pain immediately, did not hear any pops or cracks  Patient was not able to put weight on the foot immediately after the accident  Prior to this, patient was in her usual state of health  Denies any recent history of systemic illness  Prior to Admission Medications   Prescriptions Last Dose Informant Patient Reported? Taking? BIOTIN PO  Self Yes No   Sig: Take 1 tablet by mouth daily   Calcium Carbonate (CALCIUM 600 PO)  Self Yes No   Sig: Take 1 tablet by mouth daily   Cholecalciferol (VITAMIN D) 2000 units CAPS  Self Yes No   Sig: Take 1 capsule by mouth daily   Multiple Vitamin (MULTIVITAMIN) capsule  Self Yes No   Sig: Take 1 capsule by mouth daily   citalopram (CeleXA) 20 mg tablet  Self Yes No   Sig: Take 20 mg by mouth daily  cyanocobalamin (VITAMIN B-12) 1,000 mcg tablet  Self Yes No   Sig: Take 1,000 mcg by mouth daily   fluocinolone acetonide (DERMOTIC) 0 01 % otic oil   No No   Sig: Administer 5 drops into both ears 2 (two) times a day as needed (Ear Itching)   hydroxychloroquine (PLAQUENIL) 200 mg tablet  Self Yes No   Sig: Take 200 mg by mouth daily     omeprazole (PriLOSEC) 20 mg delayed release capsule  Self Yes No   Sig: Take 20 mg by mouth 2 (two) times a day        Facility-Administered Medications: None       Past Medical History:   Diagnosis Date    Full dentures     GERD (gastroesophageal reflux disease)     Hiatal hernia     HL (hearing loss)     Kidney stone     [facility]     Rheumatoid arthritis ([facility] [address] )     Sleep apnea     resolved since gastric sleeve surgery    Sleep difficulties     TMJ dysfunction     Wears glasses        Past Surgical History:   Procedure Laterality Date    ADENOIDECTOMY      BARIATRIC SURGERY      CHOLECYSTECTOMY      GANGLION CYST EXCISION Left 12/15/2017    Procedure: 2ND INTERSPACE MORTONS NEUROMA EXCISION;  Surgeon: Anthony Bucio DPM;  Location: [facility];  Service: Podiatry    HYSTERECTOMY      LITHOTRIPSY      PARATHYROID GLAND SURGERY      x2    SLEEVE GASTROPLASTY      TONSILLECTOMY         Family History   Problem Relation Age of Onset    Hyperparathyroidism Father     Hyperparathyroidism Sister     Hyperparathyroidism Family      I have reviewed and agree with the history as documented  E-Cigarette/Vaping    E-Cigarette Use Never User      E-Cigarette/Vaping Substances     Social History     Tobacco Use    Smoking status: Former Smoker    Smokeless tobacco: Never Used    Tobacco comment: quit over 30 years   Substance Use Topics    Alcohol use: Yes     Comment: socially    Drug use: No       Review of Systems   Constitutional: Negative for chills and fever  Respiratory: Negative for apnea, cough, choking, chest tightness, shortness of breath, wheezing and stridor  Cardiovascular: Negative for chest pain and leg swelling  Gastrointestinal: Negative for abdominal distention, abdominal pain, constipation, diarrhea, nausea and rectal pain  Genitourinary: Negative for dysuria and hematuria  Musculoskeletal: Positive for joint swelling  Negative for back pain, gait problem and neck pain  Left ankle, left foot pain   Skin: Negative for color change, pallor, rash and wound  Neurological: Negative for dizziness, tremors, seizures, syncope, facial asymmetry, speech difficulty, weakness, light-headedness, numbness and headaches  Physical Exam  Physical Exam  Vitals signs and nursing note reviewed  Constitutional:       General: She is not in acute distress  Appearance: She is well-developed   She is not ill-appearing, toxic-appearing or diaphoretic  HENT:      Head: Normocephalic and atraumatic  Right Ear: External ear normal       Left Ear: External ear normal       Nose: Nose normal    Eyes:      General:         Right eye: No discharge  Left eye: No discharge  Extraocular Movements: Extraocular movements intact  Conjunctiva/sclera: Conjunctivae normal       Pupils: Pupils are equal, round, and reactive to light  Neck:      Musculoskeletal: Normal range of motion and neck supple  No neck rigidity or muscular tenderness  Cardiovascular:      Rate and Rhythm: Normal rate and regular rhythm  Heart sounds: Normal heart sounds  No murmur  No friction rub  No gallop  Pulmonary:      Effort: Pulmonary effort is normal  No respiratory distress  Breath sounds: Normal breath sounds  No stridor  No wheezing, rhonchi or rales  Chest:      Chest wall: No tenderness  Abdominal:      General: Bowel sounds are normal  There is no distension  Palpations: Abdomen is soft  There is no mass  Tenderness: There is no abdominal tenderness  There is no guarding or rebound  Hernia: No hernia is present  Musculoskeletal: Normal range of motion  General: Swelling (Left lateral malleolus) present  No tenderness or deformity  Skin:     General: Skin is warm and dry  Capillary Refill: Capillary refill takes less than 2 seconds  Findings: Bruising (Above the 5th metacarpal of the left foot, tender on palpation) present  Neurological:      Mental Status: She is alert and oriented to person, place, and time           Vital Signs  ED Triage Vitals   Temperature Pulse Respirations Blood Pressure SpO2   02/13/21 1356 02/13/21 1356 02/13/21 1356 02/13/21 1356 02/13/21 1356   97 7 °F (36 5 °C) 74 18 121/76 96 %      Temp Source Heart Rate Source Patient Position - Orthostatic VS BP Location FiO2 (%)   02/13/21 1356 02/13/21 1356 02/13/21 1444 02/13/21 1444 --   Oral Monitor Lying Right arm       Pain Score       02/13/21 1356       4           Vitals:    02/13/21 1356 02/13/21 1444 02/13/21 1445   BP: 121/76 153/80 144/74   Pulse: 74 65 70   Patient Position - Orthostatic VS:  Lying          Visual Acuity  Visual Acuity      Most Recent Value   L Pupil Size (mm)  3   R Pupil Size (mm)  3          ED Medications  Medications - No data to display    Diagnostic Studies  Results Reviewed     None                 XR ankle 3+ views LEFT   Final Result by Isaak Magaña MD (02/13 1447)      1   5th metatarsal base fracture   2  Findings concerning for avulsion fracture at the dorsal talus      The study was marked in EPIC for immediate notification  Workstation performed: [id number]         XR foot 3+ views LEFT   Final Result by Isaak Magaña MD (02/13 1447)      1   5th metatarsal base fracture   2  Findings concerning for avulsion fracture at the dorsal talus      The study was marked in EPIC for immediate notification  Workstation performed: [id number]                    Procedures  Procedures         ED Course  ED Course as of Feb 13 1653   Sat Feb 13, 2021   1456 1   5th metatarsal base fracture  2  Findings concerning for avulsion fracture at the dorsal talus   XR ankle 3+ views LEFT   1518 Differential diagnosis included fracture, dislocation, sprain, soft tissue swelling  Imaging showed fracture findings as above  Patient was placed in a splint, made nonweightbearing, given crutches, and told to follow-up with orthopedics  Strict return precautions given  Patient verbalized understanding and will follow up as an outpatient  Patient remained hemodynamically and clinically stable in the emergency department during her stay  Upon discharge, patient was fully alert, oriented, GCS 15, without any further complaints, however she was unable to walk with crutches and repeatedly almost fell, very unstable  Trauma was consulted  7585 Trauma at bedside  splint was removed  An hard sole shoe was placed  patient fared better with hard sole shoe and crutches  Patient was discharged by Trauma  SBIRT 22yo+      Most Recent Value   SBIRT (22 yo +)   In order to provide better care to our patients, we are screening all of our patients for alcohol and drug use  Would it be okay to ask you these screening questions? Yes Filed at: 02/13/2021 1400   Initial Alcohol Screen: US AUDIT-C    1  How often do you have a drink containing alcohol?  0 Filed at: 02/13/2021 1400   2  How many drinks containing alcohol do you have on a typical day you are drinking? 0 Filed at: 02/13/2021 1400   3a  Male UNDER 65: How often do you have five or more drinks on one occasion? 0 Filed at: 02/13/2021 1400   3b  FEMALE Any Age, or MALE 65+: How often do you have 4 or more drinks on one occassion? 0 Filed at: 02/13/2021 1400   Audit-C Score  0 Filed at: 02/13/2021 1400   BALDO: How many times in the past year have you    Used an illegal drug or used a prescription medication for non-medical reasons? Never Filed at: 02/13/2021 1400                    MDM    Disposition  Final diagnoses:   Closed nondisplaced fracture of fifth metatarsal bone of left foot, initial encounter   Closed nondisplaced avulsion fracture of left talus, initial encounter     Time reflects when diagnosis was documented in both MDM as applicable and the Disposition within this note     Time User Action Codes Description Comment    2/13/2021  2:58 PM Sp Hastings Add [S92 355A] Closed nondisplaced fracture of fifth metatarsal bone of left foot, initial encounter     2/13/2021  2:58 PM Sp Hastings Add [K25 404S] Closed nondisplaced avulsion fracture of left talus, initial encounter       ED Disposition     ED Disposition Condition Date/Time Comment    Discharge Stable Sat Feb 13, 2021  3:01 PM Seymour Espino discharge to home/self care              Follow-up Information     Follow up With Specialties Details Why Contact Info Additional [id number] [facility] Schedule an appointment as soon as possible for a visit in 3 days For wound re-check [address] [facility] [place] [place], [address] [place] [address] Melvin Retana [facility]          Patient's Medications   Discharge Prescriptions    OXYCODONE-ACETAMINOPHEN (PERCOCET) 5-325 MG PER TABLET    Take 1 tablet by mouth every 4 (four) hours as needed for moderate pain for up to 10 daysMax Daily Amount: 6 tablets       Start Date: 2/13/2021 End Date: 2/23/2021       Order Dose: 1 tablet       Quantity: 5 tablet    Refills: 0         PDMP Review     None          ED Provider  Electronically Signed by           Lian Dueñas MD  02/13/21 1401 [place] Kim MD  02/13/21 6139

## 2021-02-13 NOTE — CASE MANAGEMENT
CM made a DME referral to [facility] for a roller walker and delivered one to the Pt's room prior to d/c

## 2021-02-13 NOTE — ED NOTES
Pt unable to tolerate crutches, PT and trauma at bedside for eval  Splint removed from ankle and cam boot applied  Pt able to walk with walker and cam boot        Margarita Mosqueda RN  02/13/21 1450

## 2021-02-13 NOTE — PHYSICAL THERAPY NOTE
PHYSICAL THERAPY EVALUATION NOTE    Patient Name: Astrid Torres  WWLDJ'C Date: 2/13/2021  AGE:   70 y o  Mrn:   [id number]  ADMIT DX:  No admission diagnoses are documented for this encounter  Past Medical History:   Diagnosis Date    Full dentures     GERD (gastroesophageal reflux disease)     Hiatal hernia     HL (hearing loss)     Kidney stone     RA (rheumatoid arthritis) (HCC)     Rheumatoid arthritis (HCC)     Sleep apnea     resolved since gastric sleeve surgery    Sleep difficulties     TMJ dysfunction     Wears glasses      Length Of Stay: 0  PHYSICAL THERAPY EVALUATION :    02/13/21 1640   PT Last Visit   PT Visit Date 02/13/21   Note Type   Note type Evaluation   Pain Assessment   Pain Assessment Tool 0-10   Pain Score 1   Pain Location/Orientation Orientation: Left; Location: Foot   Home Living   Type of [address] [place] [address] One level;Ramped entrance   Additional Comments lives w/ friends  ambulates w/o assistive device  owns knee scooter  independent w/ ADLs  1 fall in last 6 months  Prior Function   Comments pt seen supine in bed  agreed to PT eval  reports left foot pain  Restrictions/Precautions   LLE Weight Bearing Per Order WBAT  (w/ CAM boot)   Braces or Orthoses CAM Boot  (to L LE)   Other Precautions WBS; Fall Risk;Pain   General   Family/Caregiver Present No   Cognition   Arousal/Participation Alert   Orientation Level Oriented to person; Other (Comment)  (pt was identified w/ full name, birth date)   Following Commands Follows one step commands without difficulty   RUE Assessment   RUE Assessment WFL   LUE Assessment   LUE Assessment WFL   RLE Assessment   RLE Assessment WFL  (4/5)   LLE Assessment   LLE Assessment X  (hip and knee 4-/5, ankle limited ROM (MMT not done))   Coordination   Movements are Fluid and Coordinated 1   Light Touch   RLE Light Touch Grossly intact   LLE Light Touch Grossly intact   Bed Mobility   Supine to Sit 7  Independent   Sit to Supine 7  Independent   Transfers   Sit to Stand 5  Supervision   Additional items Increased time required;Verbal cues  (for hand placement)   Stand to Sit 5  Supervision   Additional items Verbal cues  (for hand placement)   Additional Comments pt was sized for and provided w/ L LE CAM boot per Trauma orders  Pt was provided w/ medium CAM boot  therapist provided education including indications for use, donning/doffing technique, adjustments w/ velcro closures, need for wearing under brace, and need for skin checks  pt had good carryover of education provided (via verbal instruction, demonstration, teachback)  pt needed supervision assist for donning and doffing brace  pt was educated to contact doctor if redness noted when wearing brace for more than 5 minutes after reviewing  Ambulation/Elevation   Gait pattern Decreased R stance; Antalgic   Gait Assistance 5  Supervision   Additional items Verbal cues  (for sequencing, walker placement)   Assistive Device Rolling walker; Other (Comment)  (knee scooter)   Distance 80 feet w/ roller walker  seated rest break x 2 minutes  150 feet w/ knee scooter  (additional not possible due to fatigue)   Balance   Static Sitting Good   Dynamic Sitting Fair   Static Standing Fair   Ambulatory Fair -  (w/ roller walker)   Activity Tolerance   Activity Tolerance Patient limited by fatigue;Patient limited by pain   Nurse Made Aware spoke to Megan Sandoval   Assessment   Prognosis Good   Problem List Decreased strength;Decreased range of motion;Decreased endurance; Impaired balance;Decreased mobility; Decreased safety awareness;Orthopedic restrictions;Pain   Assessment Pt presents to the emergency department with left ankle swelling and pain  Pt reports  tripping on her shoe and everting her left ankle  Reports feeling immediate pain after the event   Dx: left 5th metatarsal fx and avulsion fx of the dorsal talus  order placed for PT eval and tx  Trauma note states WBAT w/ CAM boot  pt presents w/ comorbidity of RA and personal factors of hearing loss, advanced age and positive fall history  pt presents w/ pain, weakness, decreased ROM, decreased endurance, impaired balance, gait deviations, decreased safety awareness, orthopedic restrictions and fall risk  these impairments are evident in findings from physical examination (weakness and decreased ROM), mobility assessment (need for standby assist w/ all phases of mobility when usually mobilizing independently, tolerance to only 80 feet of ambulation and need for cueing for mobility technique), and Barthel Index: 80/100  pt needed input for mobility technique and safety  pt is at risk for falls due to physical and safety awareness deficits  pt's clinical presentation is unstable/unpredictable (evident in need for standby assist w/ all phases of mobility when usually mobilizing independently, tolerance to only 80 feet of ambulation, pain impacting overall mobility status and need for input for mobility technique/safety)  pt needs inpatient PT tx to improve mobility deficits  discharge recommendation is for outpatient PT to reduce fall risk and maximize level of functional independence (once clearance for participation is provided by Orthopedics upon outpatient follow up)  Goals   Patient Goals go home today     STG Expiration Date 02/23/21   Short Term Goal #1 pt will: [facility] and jennie left CAM boot independently to facilitate return to independent level of function, Increase L LE strength 1/2 grade to facilitate independent mobility, Perform all transfers independently to improve independence, Ambulate 250 ft  with roller walker independently w/o LOB to expedite safe return home, Increase ambulatory balance 1/2 grade to decrease risk for falls, Complete exercise program independently to increase strength and endurance, Tolerate 3 hr OOB to faciliate upright tolerance and Improve Barthel Index score to 100 to facilitate independence   PT Treatment Day 0   Plan   Treatment/Interventions Functional transfer training;LE strengthening/ROM; Therapeutic exercise; Endurance training;Patient/family training;Equipment eval/education;Gait training   PT Frequency 5x/wk   Recommendation   PT Discharge Recommendation Home with skilled therapy; Other (Comment)  (outpatient PT (upon approval by Orthopedics as outpatient))   Equipment Recommended Other (Comment)  (roller walker, left CAM boot)   AM-PAC Basic Mobility Inpatient   Turning in Bed Without Bedrails 4   Lying on Back to Sitting on Edge of Flat Bed 4   Moving Bed to Chair 4   Standing Up From Chair 3   Walk in Room 3   Climb 3-5 Stairs 1   Basic Mobility Inpatient Raw Score 19   Basic Mobility Standardized Score 42 48   Barthel Index   Feeding 10   Bathing 5   Grooming Score 5   Dressing Score 10   Bladder Score 10   Bowels Score 10   Toilet Use Score 10   Transfers (Bed/Chair) Score 10   Mobility (Level Surface) Score 10   Stairs Score 0   Barthel Index Score 80     Skilled PT recommended while in hospital and upon DC to progress pt toward treatment goals       Edenilson Dexter, PT

## 2021-02-13 NOTE — DISCHARGE SUMMARY
Discharge Summary - Virgil Rm 70 y o  female MRN: [id number]    Unit/Bed#: ED 10 Encounter: [id number]    Admission Date: D/C from ED    Admitting Diagnosis: No admission diagnoses are documented for this encounter  HPI:  Virgil Rm is a 70 y o  female who presents to the emergency department with left ankle swelling and pain  She reports  tripping on her shoe and everting her left ankle  She reports feeling immediate pain after the event, no audible pop or crack  PMH of rheumatoid Arthritis, unable to bear weight  Dis strike  her head, no LOC, no complaint of neck or back pain, no chest pain or shortness of breath  Reports she is relatively healthy  Procedures Performed:   Orders Placed This Encounter   Procedures    Splint application       Summary of Hospital Course: Seen in ED, call to Orthopedics who reviewed films  Recommended Cam Walker and WBAT  Follow up in Sports Medicine office in 2 weeks  Seen by Therapy, did well with roller walker, discharged home  Spoke with daughter who understood the plan as well as patient  Taking tylenol for pain  Significant Findings, Care, Treatment and Services Provided: Xr Ankle 3+ Views Left    Result Date: 2/13/2021  Impression: 1   5th metatarsal base fracture 2  Findings concerning for avulsion fracture at the dorsal talus The study was marked in EPIC for immediate notification  Workstation performed: [id number]     Xr Foot 3+ Views Left    Result Date: 2/13/2021  Impression: 1   5th metatarsal base fracture 2  Findings concerning for avulsion fracture at the dorsal talus The study was marked in EPIC for immediate notification   Workstation performed: [id number]       Complications: none    Discharge Diagnosis: S/P Fall  Left 5th metatarsal fracture    Resolved Problems  Date Reviewed: 3/15/2019    None          Condition at Discharge: stable         Discharge instructions/Information to patient and family:   See after visit summary for information provided to patient and family  Provisions for Follow-Up Care:  See after visit summary for information related to follow-up care and any pertinent home health orders  PCP: Cookie Jacques MD    Disposition: Home    Planned Readmission: No      Discharge Statement   I spent 30 minutes discharging the patient  This time was spent on the day of discharge  I had direct contact with the patient on the day of discharge  Additional documentation is required if more than 30 minutes were spent on discharge  Discharge Medications:  See after visit summary for reconciled discharge medications provided to patient and family

## 2021-02-13 NOTE — DISCHARGE INSTRUCTIONS
You will need to follow up with orthopedics in 3-5 days for reassessment of your injury  Keep the splint on your foot and off of it by using crutches  In the meantime, you can treat your symptoms by resting, elevating your leg, and icing the painful area as needed  Please call the orthopedic doctors on Monday  If he develop any worsening symptoms including swelling, worsening pain, numbness or tingling in her foot, weakness, or any other concerning symptoms, please return to the emergency department as these could be signs of worsening illness

## 2021-02-13 NOTE — H&P
H&P Exam - Trauma   Tracy Stephen 70 y o  female MRN: [id number]  Unit/Bed#: ED 10 Encounter: [id number]    Assessment/Plan   Trauma Alert: Evaluation  Model of Arrival: Self  Trauma Team: Attending carolina and DHEERAJ singleton  Consultants: Orthopedics - spoke with Lisa Mcneill PA-c    Trauma Active Problems: S/P Trip and fall  No LOC  Left ankle and foot pain  5th metatarsal fracture on left    Trauma Plan: Splinted by ER  Spoke with Ortho and they reviewed films  Cam Walker and WBAT  Follow up with sports medicine in 2 weeks    Chief Complaint: left ankle and foot pain    History of Present Illness   HPI:  Tracy Stephen is a 70 y o  female who presents to the emergency department with left ankle swelling and pain  She reports  tripping on her shoe and everting her left ankle  She reports feeling immediate pain after the event, no audible pop or crack  PMH of rheumatoid Arthritis, unable to bear weight  Dis strike  her head, no LOC, no complaint of neck or back pain, no chest pain or shortness of breath  Reports she is relatively healthy  Mechanism:Other: trip over object    Review of Systems   Constitutional: Positive for activity change  HENT: Negative  Eyes: Negative  Respiratory: Negative  Cardiovascular: Negative  Gastrointestinal: Negative  Endocrine: Negative  Genitourinary: Negative  Musculoskeletal:        Left foot and ankle pain  Pulses intact  Minimal swelling  Unable to bear weight   Skin: Negative  Allergic/Immunologic: Negative  Neurological: Negative  Hematological: Negative  Psychiatric/Behavioral: Negative  12-point, complete review of systems was reviewed and negative except as stated above  Historical Information   History is obtainable from the patient      Past Medical History:   Diagnosis Date    Full dentures     GERD (gastroesophageal reflux disease)     Hiatal hernia     HL (hearing loss)     Kidney stone     [facility]     [facility]     Sleep apnea     resolved since gastric sleeve surgery    Sleep difficulties     TMJ dysfunction     Wears glasses      Past Surgical History:   Procedure Laterality Date    ADENOIDECTOMY      BARIATRIC SURGERY      CHOLECYSTECTOMY      GANGLION CYST EXCISION Left 12/15/2017    Procedure: 2ND INTERSPACE MORTONS NEUROMA EXCISION;  Surgeon: Lavell Whelan DPM;  Location: [facility];  Service: Podiatry    HYSTERECTOMY      LITHOTRIPSY      PARATHYROID GLAND SURGERY      x2    SLEEVE GASTROPLASTY      TONSILLECTOMY       Social History   Social History     Substance and Sexual Activity   Alcohol Use Yes    Comment: socially     Social History     Substance and Sexual Activity   Drug Use No     Social History     Tobacco Use   Smoking Status Former Smoker   Smokeless Tobacco Never Used   Tobacco Comment    quit over 30 years     E-Cigarette/Vaping    E-Cigarette Use Never User      E-Cigarette/Vaping Substances     Immunization History   Administered Date(s) Administered    TD (adult) Preservative Free 07/14/2016     Last Tetanus: unknown  Family History: Non-contributory      Meds/Allergies   all current active meds have been reviewed    Allergies   Allergen Reactions    Amoxicillin Anaphylaxis    Penicillins Anaphylaxis    Darvon [Propoxyphene]      Generalized numbness    Demerol [Meperidine] Other (See Comments)     Generalized numbness    Nsaids Other (See Comments)     S/p gastric surgery         PHYSICAL EXAM      Objective   Vitals:   First set: Temperature: 97 7 °F (36 5 °C) (02/13/21 1356)  Pulse: 74 (02/13/21 1356)  Respirations: 18 (02/13/21 1356)  Blood Pressure: 121/76 (02/13/21 1356)    Primary Survey:   (A) Airway: intact  (B) Breathing: non-labored  (C) Circulation: Pulses:   normal  (D) Disabliity:  GCS Total:  15, Eye Opening:   Spontaneous = 4, Motor Response: Obeys commands = 6 and Verbal Response:  Oriented = 5  (E) Expose: Completed    Secondary Survey: (Click on Physical Exam tab above)  Physical Exam  Constitutional:       Appearance: Normal appearance  HENT:      Head: Normocephalic and atraumatic  Nose: Nose normal       Mouth/Throat:      Mouth: Mucous membranes are moist       Pharynx: Oropharynx is clear  Eyes:      Extraocular Movements: Extraocular movements intact  Conjunctiva/sclera: Conjunctivae normal       Pupils: Pupils are equal, round, and reactive to light  Neck:      Musculoskeletal: Normal range of motion and neck supple  Cardiovascular:      Rate and Rhythm: Normal rate and regular rhythm  Pulses: Normal pulses  Heart sounds: Normal heart sounds  Pulmonary:      Effort: Pulmonary effort is normal  No respiratory distress  Breath sounds: Normal breath sounds  No stridor  No wheezing, rhonchi or rales  Chest:      Chest wall: No tenderness  Abdominal:      General: Abdomen is flat  Bowel sounds are normal  There is no distension  Palpations: There is no mass  Tenderness: There is no abdominal tenderness  There is no right CVA tenderness, left CVA tenderness, guarding or rebound  Hernia: No hernia is present  Genitourinary:     Comments: deferred  Musculoskeletal: Normal range of motion  General: Tenderness and signs of injury present  Skin:     General: Skin is warm and dry  Capillary Refill: Capillary refill takes less than 2 seconds  Neurological:      General: No focal deficit present  Mental Status: She is alert and oriented to person, place, and time  Psychiatric:         Mood and Affect: Mood normal          Behavior: Behavior normal          Thought Content: Thought content normal          Judgment: Judgment normal          Invasive Devices     None                 Lab Results: none  Imaging/EKG Studies: Left ankle and Left foot:  -    5th metatarsal base fracture  2    Findings concerning for avulsion fracture at the dorsal vidhi  Other Studies: none    Code Status: Prior  Advance Directive and Living Will:      Power of :    POLST:      Allergies to  Darvon, Darvocet and Demerol    States she gets numb  PCN causes a radh

## 2021-02-16 ENCOUNTER — OFFICE VISIT (OUTPATIENT)
Dept: OBGYN CLINIC | Facility: CLINIC | Age: 76
End: 2021-02-16
Payer: MEDICARE

## 2021-02-16 VITALS
DIASTOLIC BLOOD PRESSURE: 77 MMHG | HEART RATE: 77 BPM | HEIGHT: 60 IN | SYSTOLIC BLOOD PRESSURE: 130 MMHG | WEIGHT: 160 LBS | BODY MASS INDEX: 31.41 KG/M2

## 2021-02-16 DIAGNOSIS — S92.355A CLOSED NONDISPLACED FRACTURE OF FIFTH METATARSAL BONE OF LEFT FOOT, INITIAL ENCOUNTER: Primary | ICD-10-CM

## 2021-02-16 DIAGNOSIS — S92.155A CLOSED NONDISPLACED AVULSION FRACTURE OF LEFT TALUS, INITIAL ENCOUNTER: ICD-10-CM

## 2021-02-16 PROCEDURE — 99203 OFFICE O/P NEW LOW 30 MIN: CPT | Performed by: PHYSICIAN ASSISTANT

## 2021-02-16 PROCEDURE — 28470 CLTX METATARSAL FX WO MNP EA: CPT | Performed by: PHYSICIAN ASSISTANT

## 2021-02-16 NOTE — PATIENT INSTRUCTIONS
Weightbearing as tolerated in CAM boot  Do not need to wear the boot for sleep or showering but should wear it any time you are walking on it  Recommend taking the following supplements: Vitamin D 4000 units per day and Calcium 1200 mg per day  This will help with bone healing  Avoid NSAIDs like Motrin/Aleve/Ibuprofen  Avoid steroids/nicotine products/ rheumatoid medications like DMARDs if possible  Aspirin 81 mg BID for blood clot prevention  Script provided      Knee high compression stocking 20/30mm HG of pressure

## 2021-02-16 NOTE — PROGRESS NOTES
ANNEL Bullard  Attending, Orthopaedic Surgery  [facility] [address] [facility]      ORTHOPAEDIC FOOT AND ANKLE CLINIC VISIT     Assessment:     Encounter Diagnoses   Name Primary?  Closed nondisplaced fracture of fifth metatarsal bone of left foot, initial encounter Yes    Closed nondisplaced avulsion fracture of left talus, initial encounter             Plan:   · The patient verbalized understanding of exam findings and treatment plan  We engaged in the shared decision-making process and treatment options were discussed at length with the patient  Surgical and conservative management discussed today along with risks and benefits  · Hoa Kunz has a zone 1 base of the 5th MT fracture  · This will be treated non-operatively with WBAT in a cam boot  · Recommend vitamin D and calcium supplementation  · Tylenol as needed for pain, avoid NSAIDs  · ASA 81 mg BID for DVT ppx  · Rest, ice, elevation, and compression stocking for swelling control   Return in about 6 weeks (around 3/30/2021) for fracture care follow up  Fracture / Dislocation Treatment    Date/Time: 2/16/2021 11:25 AM  Performed by: Laura Dickerson PA-C  Authorized by: Sergio Peralta MD     Patient Location:  Clinic  Other Assisting Provider: No    Verbal consent obtained?: Yes    Risks and benefits: Risks, benefits and alternatives were discussed    Consent given by:  Patient  Patient states understanding of procedure being performed: Yes    Radiology Images displayed and confirmed   If images not available, report reviewed: Yes    Patient identity confirmed:  Verbally with patient  Injury location:  Foot  Location details:  Left foot  Injury type:  Fracture  Fracture type: fifth metatarsal    Neurovascular status: Neurovascularly intact    Distal perfusion: normal    Neurological function: normal    Range of motion: normal    Local anesthesia used?: No    Manipulation performed?: No    Immobilization:  Brace (cam boot)  Neurovascular status: Neurovascularly intact    Distal perfusion: normal    Neurological function: normal    Range of motion: normal    Patient tolerance:  Patient tolerated the procedure well with no immediate complications            History of Present Illness:   Chief Complaint:   Chief Complaint   Patient presents with   [address] is a 70 y o  female who is being seen for left foot injury  Patient reports 3 days ago, she stumbled over her slipper and inverted her left ankle  Pain is localized at base of 5th MT with minimal radiating and described as sharp and severe  Patient denies numbness, tingling or radicular pain  Denies history of neuropathy  Patient does not smoke, does not have diabetes and does not take blood thinners  Patient denies family history of anesthesia complications and has not had any complications with anesthesia  Of note, she does have rheumatoid arthritis for which she takes plaquenil  Pain/symptom timing:  Worse during the day when active  Pain/symptom context:  Worse with activites and work  Pain/symptom modifying factors:  Rest makes better, activities make worse  Pain/symptom associated signs/symptoms: none    Prior treatment   · NSAIDsNo   · Injections No   · Bracing/Orthotics Yes  - cam boot given in ED  · Physical Therapy No     Orthopedic Surgical History:   See below     Past Medical, Surgical and Social History:  Past Medical History:  has a past medical history of Full dentures, GERD (gastroesophageal reflux disease), Hiatal hernia, HL (hearing loss), Kidney stone, RA (rheumatoid arthritis) ([address] ), Rheumatoid arthritis ([address] ), Sleep apnea, Sleep difficulties, TMJ dysfunction, and Wears glasses  Problem List: does not have any pertinent problems on file  Past Surgical History:  has a past surgical history that includes Sleeve Gastroplasty; Parathyroid gland surgery; Cholecystectomy; Tonsillectomy; Bariatric Surgery;  Hysterectomy; Lithotripsy; Ganglion cyst excision (Left, 12/15/2017); and ADENOIDECTOMY  Family History: family history includes Hyperparathyroidism in her family, father, and sister  Social History:  reports that she has quit smoking  She has never used smokeless tobacco  She reports current alcohol use  She reports that she does not use drugs  Current Medications: has a current medication list which includes the following prescription(s): biotin, calcium carbonate, vitamin d, citalopram, vitamin b-12, fluocinolone acetonide, hydroxychloroquine, multivitamin, omeprazole, and oxycodone-acetaminophen  Allergies: is allergic to amoxicillin; penicillins; darvon [propoxyphene]; demerol [meperidine]; and nsaids  Review of Systems:  General- denies fever/chills  HEENT- denies hearing loss or sore throat  Eyes- denies eye pain or visual disturbances, denies red eyes  Respiratory- denies cough or SOB  Cardio- denies chest pain or palpitations  GI- denies abdominal pain  Endocrine- denies urinary frequency  Urinary- denies pain with urination  Musculoskeletal- Negative except noted above  Skin- denies rashes or wounds  Neurological- denies dizziness or headache  Psychiatric- denies anxiety or difficulty concentrating    Physical Exam:   /77   Pulse 77   Ht 5' (1 524 m)   Wt 72 6 kg (160 lb)   BMI 31 25 kg/m²   General/Constitutional: No apparent distress: well-nourished and well developed  Eyes: normal ocular motion  Cardio: RRR, Normal S1S2, No m/r/g  Lymphatic: No appreciable lymphadenopathy  Respiratory: Non-labored breathing, CTA b/l no w/c/r  Vascular: No edema, swelling or tenderness, except as noted in detailed exam   Integumentary: No impressive skin lesions present, except as noted in detailed exam   Neuro: No ataxia or tremors noted  Psych: Normal mood and affect, oriented to person, place and time  Appropriate affect  Musculoskeletal: Normal, except as noted in detailed exam and in HPI      Examination Left    Gait Antalgic, ambulating in cam boot   Musculoskeletal Tender to palpation at base of 5th MT    Skin Ecchymosis over lateral foot     Nails Normal    Range of Motion  20 degrees dorsiflexion, 40 degrees plantarflexion  Subtalar motion: normal    Stability Stable    Muscle Strength 5/5 tibialis anterior  5/5 gastrocnemius-soleus  5/5 posterior tibialis  5/5 peroneal/eversion strength  5/5 EHL  5/5 FHL    Neurologic Normal    Sensation Intact to light touch throughout sural, saphenous, superficial peroneal, deep peroneal and medial/lateral plantar nerve distributions  [place]-Flory 5 07 filament (10g) testing  deferred  Cardiovascular Brisk capillary refill < 2 seconds,intact DP and PT pulses    Special Tests None      Imaging Studies:   3 views of the left foot and left ankle were taken, reviewed and interpreted independently that demonstrate zone 1 base of the 5th metatarsal fracture and small avulsion fracture off the dorsal talus  Reviewed by me personally  Scribe Attestation    I,:  Amanuel Alfonso PA-C am acting as a scribe while in the presence of the attending physician :       I,:  Sury Serrato MD personally performed the services described in this documentation    as scribed in my presence :             Virgel Lefevre Lachman, MD  Foot & Ankle Surgery   Department of [address] [place] [address]      I personally performed the service  Virgel Lefevre Lachman, MD

## 2021-03-29 RX ORDER — INFLUENZA A VIRUS A/MICHIGAN/45/2015 X-275 (H1N1) ANTIGEN (FORMALDEHYDE INACTIVATED), INFLUENZA A VIRUS A/SINGAPORE/INFIMH-16-0019/2016 IVR-186 (H3N2) ANTIGEN (FORMALDEHYDE INACTIVATED), INFLUENZA B VIRUS B/PHUKET/3073/2013 ANTIGEN (FORMALDEHYDE INACTIVATED), AND INFLUENZA B VIRUS B/MARYLAND/15/2016 BX-69A ANTIGEN (FORMALDEHYDE INACTIVATED) 60; 60; 60; 60 UG/.7ML; UG/.7ML; UG/.7ML; UG/.7ML
INJECTION, SUSPENSION INTRAMUSCULAR
COMMUNITY

## 2021-03-30 ENCOUNTER — OFFICE VISIT (OUTPATIENT)
Dept: OBGYN CLINIC | Facility: CLINIC | Age: 76
End: 2021-03-30

## 2021-03-30 VITALS
DIASTOLIC BLOOD PRESSURE: 65 MMHG | SYSTOLIC BLOOD PRESSURE: 95 MMHG | BODY MASS INDEX: 28.47 KG/M2 | HEART RATE: 81 BPM | WEIGHT: 145 LBS | HEIGHT: 60 IN

## 2021-03-30 DIAGNOSIS — S92.155A CLOSED NONDISPLACED AVULSION FRACTURE OF LEFT TALUS, INITIAL ENCOUNTER: Primary | ICD-10-CM

## 2021-03-30 PROCEDURE — 99024 POSTOP FOLLOW-UP VISIT: CPT | Performed by: ORTHOPAEDIC SURGERY

## 2021-03-30 NOTE — PROGRESS NOTES
ANNEL Larkin  Attending, Orthopaedic Surgery  [facility] [address] [facility]      ORTHOPAEDIC FOOT AND ANKLE CLINIC VISIT     Assessment:     Encounter Diagnosis   Name Primary?  Closed nondisplaced avulsion fracture of left talus, initial encounter Yes            Plan:   · The patient verbalized understanding of exam findings and treatment plan  We engaged in the shared decision-making process and treatment options were discussed at length with the patient  Surgical and conservative management discussed today along with risks and benefits  · No TTP on exam today  · Back into a regular sneaker without issues  · Declines offer of PT as she is having no issues  · Continue to wear compression stocking as needed  · Continue Vitamin D and calcium for 4 more weeks  · See back PRN      History of Present Illness:   Chief Complaint:   Chief Complaint   Patient presents with    Left Foot - Fracture, Follow-up     Ashish Carter is a 70 y o  female who is being seen in follow-up for left 5th MT base fracture  When we last saw she we recommended boot and WBAT  Pain has improved  Residual pain is localized at ankle with minimal radiating and described as dull and achy  Pain/symptom timing:  Worse during the day when active  Pain/symptom context:  Worse with activites and work  Pain/symptom modifying factors:  Rest makes better, activities make worse  Pain/symptom associated signs/symptoms: none    Prior treatment   · NSAIDsYes   · Injections No   · Bracing/Orthotics Yes    · Physical Therapy No     Orthopedic Surgical History:   See below    Past Medical, Surgical and Social History:  Past Medical History:  has a past medical history of Full dentures, GERD (gastroesophageal reflux disease), Hiatal hernia, HL (hearing loss), Kidney stone, RA (rheumatoid arthritis) ([address] ), Rheumatoid arthritis ([address] ), Sleep apnea, Sleep difficulties, TMJ dysfunction, and Wears glasses    Problem List: does not have any pertinent problems on file  Past Surgical History:  has a past surgical history that includes Sleeve Gastroplasty; Parathyroid gland surgery; Cholecystectomy; Tonsillectomy; Bariatric Surgery; Hysterectomy; Lithotripsy; Ganglion cyst excision (Left, 12/15/2017); and ADENOIDECTOMY  Family History: family history includes Hyperparathyroidism in her family, father, and sister  Social History:  reports that she has quit smoking  She has never used smokeless tobacco  She reports current alcohol use  She reports that she does not use drugs  Current Medications: has a current medication list which includes the following prescription(s): biotin, calcium carbonate, vitamin d, citalopram, vitamin b-12, fluocinolone acetonide, hydroxychloroquine, fluzone high-dose quadrivalent, multivitamin, and omeprazole  Allergies: is allergic to amoxicillin; penicillins; darvon [propoxyphene]; demerol [meperidine]; and nsaids  Review of Systems:  General- denies fever/chills  HEENT- denies hearing loss or sore throat  Eyes- denies eye pain or visual disturbances, denies red eyes  Respiratory- denies cough or SOB  Cardio- denies chest pain or palpitations  GI- denies abdominal pain  Endocrine- denies urinary frequency  Urinary- denies pain with urination  Musculoskeletal- Negative except noted above  Skin- denies rashes or wounds  Neurological- denies dizziness or headache  Psychiatric- denies anxiety or difficulty concentrating    Physical Exam:   Wt 65 8 kg (145 lb)   BMI 28 32 kg/m²   General/Constitutional: No apparent distress: well-nourished and well developed    Eyes: normal ocular motion  Lymphatic: No appreciable lymphadenopathy  Respiratory: Non-labored breathing  Vascular: No edema, swelling or tenderness, except as noted in detailed exam   Integumentary: No impressive skin lesions present, except as noted in detailed exam   Neuro: No ataxia or tremors noted  Psych: Normal mood and affect, oriented to person, place and time  Appropriate affect  Musculoskeletal: Normal, except as noted in detailed exam and in HPI  Examination    Left    Gait Normal   Musculoskeletal No TTP    Skin Normal       Nails Normal    Range of Motion  20 degrees dorsiflexion, 30 degrees plantarflexion  Subtalar motion: normal    Stability Stable    Muscle Strength 5/5 tibialis anterior  5/5 gastrocnemius-soleus  5/5 posterior tibialis  5/5 peroneal/eversion strength  5/5 EHL  5/5 FHL    Neurologic Normal    Sensation  Intact to light touch throughout sural, saphenous, superficial peroneal, deep peroneal and medial/lateral plantar nerve distributions  [place]-Flory 5 07 filament (10g) testing deferred  Cardiovascular Brisk capillary refill < 2 seconds,intact DP and PT pulses    Special Tests None      Imaging Studies:   No new imaging        James R Lachman, MD  Foot & Ankle Surgery   Department of [address] [place] [address]      I personally performed the service  Octaviano Cyphers Lachman, MD

## 2022-02-13 ENCOUNTER — HOSPITAL ENCOUNTER (EMERGENCY)
Facility: HOSPITAL | Age: 77
Discharge: HOME | End: 2022-02-13
Attending: EMERGENCY MEDICINE
Payer: MEDICARE

## 2022-02-13 ENCOUNTER — APPOINTMENT (EMERGENCY)
Dept: RADIOLOGY | Facility: HOSPITAL | Age: 77
End: 2022-02-13
Payer: MEDICARE

## 2022-02-13 VITALS
HEART RATE: 60 BPM | BODY MASS INDEX: 28.7 KG/M2 | TEMPERATURE: 98.3 F | WEIGHT: 146.16 LBS | SYSTOLIC BLOOD PRESSURE: 132 MMHG | DIASTOLIC BLOOD PRESSURE: 66 MMHG | RESPIRATION RATE: 18 BRPM | HEIGHT: 60 IN | OXYGEN SATURATION: 99 %

## 2022-02-13 DIAGNOSIS — R11.2 NAUSEA & VOMITING: ICD-10-CM

## 2022-02-13 DIAGNOSIS — R42 LIGHTHEADEDNESS: Primary | ICD-10-CM

## 2022-02-13 DIAGNOSIS — R07.9 CHEST PAIN, UNSPECIFIED TYPE: ICD-10-CM

## 2022-02-13 LAB
ALBUMIN SERPL BCP-MCNC: 3.2 G/DL (ref 3.5–5)
ALP SERPL-CCNC: 47 U/L (ref 46–116)
ALT SERPL W P-5'-P-CCNC: 14 U/L (ref 12–78)
ANION GAP SERPL CALCULATED.3IONS-SCNC: 7 MMOL/L (ref 4–13)
AST SERPL W P-5'-P-CCNC: 23 U/L (ref 5–45)
ATRIAL RATE: 55 BPM
BASOPHILS # BLD AUTO: 0.03 THOUSANDS/ΜL (ref 0–0.1)
BASOPHILS NFR BLD AUTO: 1 % (ref 0–1)
BILIRUB SERPL-MCNC: 0.48 MG/DL (ref 0.2–1)
BUN SERPL-MCNC: 14 MG/DL (ref 5–25)
CALCIUM ALBUM COR SERPL-MCNC: 10.7 MG/DL (ref 8.3–10.1)
CALCIUM SERPL-MCNC: 10.1 MG/DL (ref 8.3–10.1)
CARDIAC TROPONIN I PNL SERPL HS: 3 NG/L (ref 8–18)
CHLORIDE SERPL-SCNC: 102 MMOL/L (ref 100–108)
CO2 SERPL-SCNC: 26 MMOL/L (ref 21–32)
CREAT SERPL-MCNC: 0.92 MG/DL (ref 0.6–1.3)
EOSINOPHIL # BLD AUTO: 0.08 THOUSAND/ΜL (ref 0–0.61)
EOSINOPHIL NFR BLD AUTO: 1 % (ref 0–6)
ERYTHROCYTE [DISTWIDTH] IN BLOOD BY AUTOMATED COUNT: 13 % (ref 11.6–15.1)
GFR SERPL CREATININE-BSD FRML MDRD: 61 ML/MIN/1.73SQ M
GLUCOSE SERPL-MCNC: 78 MG/DL (ref 65–140)
HCT VFR BLD AUTO: 37.3 % (ref 34.8–46.1)
HGB BLD-MCNC: 11.9 G/DL (ref 11.5–15.4)
IMM GRANULOCYTES # BLD AUTO: 0.03 THOUSAND/UL (ref 0–0.2)
IMM GRANULOCYTES NFR BLD AUTO: 1 % (ref 0–2)
LYMPHOCYTES # BLD AUTO: 1.21 THOUSANDS/ΜL (ref 0.6–4.47)
LYMPHOCYTES NFR BLD AUTO: 20 % (ref 14–44)
MCH RBC QN AUTO: 30.3 PG (ref 26.8–34.3)
MCHC RBC AUTO-ENTMCNC: 31.9 G/DL (ref 31.4–37.4)
MCV RBC AUTO: 95 FL (ref 82–98)
MONOCYTES # BLD AUTO: 0.7 THOUSAND/ΜL (ref 0.17–1.22)
MONOCYTES NFR BLD AUTO: 11 % (ref 4–12)
NEUTROPHILS # BLD AUTO: 4.14 THOUSANDS/ΜL (ref 1.85–7.62)
NEUTS SEG NFR BLD AUTO: 66 % (ref 43–75)
NRBC BLD AUTO-RTO: 0 /100 WBCS
P AXIS: 60 DEGREES
PLATELET # BLD AUTO: 275 THOUSANDS/UL (ref 149–390)
PMV BLD AUTO: 8.7 FL (ref 8.9–12.7)
POTASSIUM SERPL-SCNC: 4.6 MMOL/L (ref 3.5–5.3)
PR INTERVAL: 180 MS
PROT SERPL-MCNC: 6.7 G/DL (ref 6.4–8.2)
QRS AXIS: -34 DEGREES
QRSD INTERVAL: 92 MS
QT INTERVAL: 458 MS
QTC INTERVAL: 438 MS
RBC # BLD AUTO: 3.93 MILLION/UL (ref 3.81–5.12)
SODIUM SERPL-SCNC: 135 MMOL/L (ref 136–145)
T WAVE AXIS: 1 DEGREES
VENTRICULAR RATE: 55 BPM
WBC # BLD AUTO: 6.19 THOUSAND/UL (ref 4.31–10.16)

## 2022-02-13 PROCEDURE — 93010 ELECTROCARDIOGRAM REPORT: CPT | Performed by: INTERNAL MEDICINE

## 2022-02-13 PROCEDURE — 80053 COMPREHEN METABOLIC PANEL: CPT | Performed by: EMERGENCY MEDICINE

## 2022-02-13 PROCEDURE — 71045 X-RAY EXAM CHEST 1 VIEW: CPT

## 2022-02-13 PROCEDURE — 36415 COLL VENOUS BLD VENIPUNCTURE: CPT | Performed by: EMERGENCY MEDICINE

## 2022-02-13 PROCEDURE — 93005 ELECTROCARDIOGRAM TRACING: CPT

## 2022-02-13 PROCEDURE — 85025 COMPLETE CBC W/AUTO DIFF WBC: CPT | Performed by: EMERGENCY MEDICINE

## 2022-02-13 PROCEDURE — 84484 ASSAY OF TROPONIN QUANT: CPT | Performed by: EMERGENCY MEDICINE

## 2022-02-13 PROCEDURE — 99284 EMERGENCY DEPT VISIT MOD MDM: CPT

## 2022-02-13 PROCEDURE — 99285 EMERGENCY DEPT VISIT HI MDM: CPT | Performed by: EMERGENCY MEDICINE

## 2022-02-13 RX ORDER — ONDANSETRON 4 MG/1
8 TABLET, ORALLY DISINTEGRATING ORAL EVERY 8 HOURS PRN
Qty: 10 TABLET | Refills: 3 | Status: SHIPPED | OUTPATIENT
Start: 2022-02-13

## 2022-02-13 NOTE — ED PROCEDURE NOTE
PROCEDURE  ECG 12 Lead Documentation Only    Date/Time: 2/13/2022 11:05 AM  Performed by: Ammy Kimbrough MD  Authorized by: Ammy Kimbrough MD     Indications / Diagnosis:  Indigestion -like cp   ECG reviewed by me, the ED Provider: yes    Previous ECG:     Previous ECG:  Compared to current    Comparison ECG info:  12/15/17v3 - v4 t wave flattening and decreased rate- no sign changes     Similarity:  Changes noted    Comparison to cardiac monitor: Yes    Interpretation:     Interpretation: non-specific    Rate:     ECG rate:  55    ECG rate assessment: bradycardic    Rhythm:     Rhythm: sinus bradycardia    Ectopy:     Ectopy: none    QRS:     QRS axis:  Left    QRS intervals:  Normal  Conduction:     Conduction: normal    ST segments:     ST segments:  Normal  T waves:     T waves: flattening      Flattening:  III, aVF, V1, V3 and V4  Q waves:     Q waves:  V1  Other findings:     Other findings: LVH, poor R wave progression and U wave    Comments:      No ecg signs of ischemia/ injury / r heart strain/ tigist/pericarditis          Ammy Kimbrough MD  02/13/22 1111

## 2022-02-13 NOTE — DISCHARGE INSTRUCTIONS
DIAGNOSIS; LIGHTHEADEDNESS/  NAUSEA/ VOMITING/ CHEST PAIN -CHEST PAIN LIKELY GASTRO INTESTINAL IN ORIGIN     - DIET AS TOLERATED --- START  WITH SIPS AND ADVANCE GRADUALLY TO MORE SOLID FOODS OVER TIME     -  FOR NAUSEA/ VOMITING : ZOFRAN 2 TABLETS DISSOLVE  IN THE MOUTH  EVERY 6-8 HRS AS NEEDED    - PLEASE RETURN TO  THE ER FOR ANY INTRACTABLE VOMITING WITH THE INABILITY TO KEEP ANYTHING DOWN BY MOUTH // ANY BLOODY /COFFEE GROUND VOMITUS ANY WORSENING CHEST PAIN / ABDOMINAL PAIN/ SHORTNESS OF BREATH / BLOODY Sahil Pouch TARRY STOOLS- ANY PASSING OUT OR ANY NEW/ WORSENING/CONCERNING SYMPTOMS TO YOU

## 2022-02-14 NOTE — ED PROVIDER NOTES
History  Chief Complaint   Patient presents with    Vertigo - Recurrent     Patient c/o malaise, vertigo worsening with near syncope, and denies chest pain or shortness of breath  78 yr female yesterday during later afternoon to evening  With what she describes as heartburn like cp-- intermitent -- worse after 3 episodes of non bloody /coffee ground emesis--  No abd pAIN - NO SOB- NO MELENA/ NO RECENT INCREASE IN GERD/DYSPEPSIA/PROGRESSIVE DYSPHAGIA/ - HX OF GASTRIC SLEEVE  IN PAST- THIS AM AFTER BENDING OVER TO GET VASE C/O FEELING LIGHTHEADED- WEAK- NO VERTIGO- NI SYNCOPE- NO CP OR VOMITUS TODAY       History provided by:  Patient and relative   used: No        Prior to Admission Medications   Prescriptions Last Dose Informant Patient Reported? Taking? BIOTIN PO  Self Yes No   Sig: Take 1 tablet by mouth daily   Calcium Carbonate (CALCIUM 600 PO)  Self Yes No   Sig: Take 1 tablet by mouth daily   Cholecalciferol (VITAMIN D) 2000 units CAPS  Self Yes No   Sig: Take 1 capsule by mouth daily   Multiple Vitamin (MULTIVITAMIN) capsule  Self Yes No   Sig: Take 1 capsule by mouth daily   citalopram (CeleXA) 20 mg tablet  Self Yes No   Sig: Take 20 mg by mouth daily  cyanocobalamin (VITAMIN B-12) 1,000 mcg tablet  Self Yes No   Sig: Take 1,000 mcg by mouth daily   fluocinolone acetonide (DERMOTIC) 0 01 % otic oil   No No   Sig: Administer 5 drops into both ears 2 (two) times a day as needed (Ear Itching)   hydroxychloroquine (PLAQUENIL) 200 mg tablet  Self Yes No   Sig: Take 200 mg by mouth daily     influenza vaccine, high-dose (Fluzone High-Dose Quadrivalent) 0 7 ML DEL   Yes No   Sig: Fluzone High-Dose Quad 2020-21 (PF) 240 mcg/0 7 mL IM syringe   ADM 0 7ML IM UTD   omeprazole (PriLOSEC) 20 mg delayed release capsule  Self Yes No   Sig: Take 20 mg by mouth 2 (two) times a day        Facility-Administered Medications: None       Past Medical History:   Diagnosis Date    Full dentures     GERD (gastroesophageal reflux disease)     Hiatal hernia     HL (hearing loss)     Kidney stone     RA (rheumatoid arthritis) (HCC)     Rheumatoid arthritis (HCC)     Sleep apnea     resolved since gastric sleeve surgery    Sleep difficulties     TMJ dysfunction     Wears glasses        Past Surgical History:   Procedure Laterality Date    ADENOIDECTOMY      BARIATRIC SURGERY      CHOLECYSTECTOMY      GANGLION CYST EXCISION Left 12/15/2017    Procedure: 2ND INTERSPACE MORTONS NEUROMA EXCISION;  Surgeon: Hernando Soto DPM;  Location: [facility];  Service: Podiatry    HYSTERECTOMY      LITHOTRIPSY      PARATHYROID GLAND SURGERY      x2    SLEEVE GASTROPLASTY      TONSILLECTOMY         Family History   Problem Relation Age of Onset    Hyperparathyroidism Father     Hyperparathyroidism Sister     Hyperparathyroidism Family      I have reviewed and agree with the history as documented  E-Cigarette/Vaping    E-Cigarette Use Never User      E-Cigarette/Vaping Substances     Social History     Tobacco Use    Smoking status: Former Smoker    Smokeless tobacco: Never Used    Tobacco comment: quit over 30 years   Vaping Use    Vaping Use: Never used   Substance Use Topics    Alcohol use: Yes     Comment: socially    Drug use: No       Review of Systems   Constitutional: Negative  HENT: Negative  Eyes: Negative  Respiratory: Negative  Cardiovascular: Positive for chest pain  Negative for palpitations and leg swelling  Gastrointestinal: Positive for nausea and vomiting  Negative for abdominal distention, abdominal pain, anal bleeding, blood in stool, constipation, diarrhea and rectal pain  Endocrine: Negative  Genitourinary: Negative  Musculoskeletal: Negative  Skin: Negative  Allergic/Immunologic: Negative  Neurological: Positive for light-headedness   Negative for dizziness, tremors, seizures, syncope, facial asymmetry, speech difficulty, weakness, numbness and headaches  Hematological: Negative  Psychiatric/Behavioral: Negative  Physical Exam  Physical Exam  Vitals and nursing note reviewed  Constitutional:       General: She is not in acute distress  Appearance: Normal appearance  She is not ill-appearing, toxic-appearing or diaphoretic  Comments: avss-- pulse ox 99 % on ra- interpretation is normal- no intervention    HENT:      Head: Normocephalic and atraumatic  Nose: Nose normal       Mouth/Throat:      Mouth: Mucous membranes are moist    Eyes:      General: No scleral icterus  Right eye: No discharge  Left eye: No discharge  Extraocular Movements: Extraocular movements intact  Conjunctiva/sclera: Conjunctivae normal       Pupils: Pupils are equal, round, and reactive to light  Comments: Mm pink   Neck:      Vascular: No carotid bruit  Comments: No pmt c/t/l/s spine   Cardiovascular:      Rate and Rhythm: Normal rate and regular rhythm  Pulses: Normal pulses  Heart sounds: Normal heart sounds  No murmur heard  No friction rub  No gallop  Pulmonary:      Effort: Pulmonary effort is normal  No respiratory distress  Breath sounds: Normal breath sounds  No stridor  No wheezing or rales  Chest:      Chest wall: No tenderness  Abdominal:      General: Bowel sounds are normal  There is no distension  Palpations: Abdomen is soft  There is no mass  Tenderness: There is no abdominal tenderness  There is no right CVA tenderness, left CVA tenderness, guarding or rebound  Hernia: No hernia is present  Comments: Soft nt/nd- no hsm- no cva tenderness- no ascites- no peritoneal signs- no pulsatile abd mass/bruit/ tenderness   Musculoskeletal:         General: No swelling, tenderness, deformity or signs of injury  Normal range of motion  Cervical back: Normal range of motion and neck supple  No rigidity or tenderness  Right lower leg: Edema present        Left lower leg: Edema present  Comments: Trace ble pretibial edema- nt- no asym/erythema- equal bilateral radial/dp pulses   Lymphadenopathy:      Cervical: No cervical adenopathy  Skin:     General: Skin is warm  Capillary Refill: Capillary refill takes less than 2 seconds  Coloration: Skin is not jaundiced or pale  Findings: No bruising, erythema, lesion or rash  Neurological:      General: No focal deficit present  Mental Status: She is alert and oriented to person, place, and time  Mental status is at baseline  Cranial Nerves: No cranial nerve deficit  Sensory: No sensory deficit  Motor: No weakness  Coordination: Coordination normal       Gait: Gait normal       Comments: Normal non focal neuro exam    Psychiatric:         Mood and Affect: Mood normal          Behavior: Behavior normal          Thought Content:  Thought content normal          Judgment: Judgment normal          Vital Signs  ED Triage Vitals [02/13/22 0948]   Temperature Pulse Respirations Blood Pressure SpO2   98 3 °F (36 8 °C) 57 18 135/66 99 %      Temp Source Heart Rate Source Patient Position - Orthostatic VS BP Location FiO2 (%)   Oral Monitor Lying Left arm --      Pain Score       No Pain           Vitals:    02/13/22 0948 02/13/22 1130   BP: 135/66 132/66   Pulse: 57 60   Patient Position - Orthostatic VS: Lying Lying         Visual Acuity      ED Medications  Medications - No data to display    Diagnostic Studies  Results Reviewed     Procedure Component Value Units Date/Time    Comprehensive metabolic panel [[id number]]  (Abnormal) Collected: 02/13/22 1026    Lab Status: Final result Specimen: Blood from Arm, Right Updated: 02/13/22 1139     Sodium 135 mmol/L      Potassium 4 6 mmol/L      Chloride 102 mmol/L      CO2 26 mmol/L      ANION GAP 7 mmol/L      BUN 14 mg/dL      Creatinine 0 92 mg/dL      Glucose 78 mg/dL      Calcium 10 1 mg/dL      Corrected Calcium 10 7 mg/dL      AST 23 U/L      ALT 14 U/L      Alkaline Phosphatase 47 U/L      Total Protein 6 7 g/dL      Albumin 3 2 g/dL      Total Bilirubin 0 48 mg/dL      eGFR 61 ml/min/1 73sq m     Narrative:      [facility] guidelines for Chronic Kidney Disease (CKD):     Stage 1 with normal or high GFR (GFR > 90 mL/min/1 73 square meters)    Stage 2 Mild CKD (GFR = 60-89 mL/min/1 73 square meters)    Stage 3A Moderate CKD (GFR = 45-59 mL/min/1 73 square meters)    Stage 3B Moderate CKD (GFR = 30-44 mL/min/1 73 square meters)    Stage 4 Severe CKD (GFR = 15-29 mL/min/1 73 square meters)    Stage 5 End Stage CKD (GFR <15 mL/min/1 73 square meters)  Note: GFR calculation is accurate only with a steady state creatinine    High Sensitivity Troponin I Random [[id number]]  (Abnormal) Collected: 02/13/22 1026    Lab Status: Final result Specimen: Blood from Arm, Right Updated: 02/13/22 1113     HS TnI random 3 ng/L     CBC and differential [[id number]]  (Abnormal) Collected: 02/13/22 1026    Lab Status: Final result Specimen: Blood from Arm, Right Updated: 02/13/22 1031     WBC 6 19 Thousand/uL      RBC 3 93 Million/uL      Hemoglobin 11 9 g/dL      Hematocrit 37 3 %      MCV 95 fL      MCH 30 3 pg      MCHC 31 9 g/dL      RDW 13 0 %      MPV 8 7 fL      Platelets 465 Thousands/uL      nRBC 0 /100 WBCs      Neutrophils Relative 66 %      Immat GRANS % 1 %      Lymphocytes Relative 20 %      Monocytes Relative 11 %      Eosinophils Relative 1 %      Basophils Relative 1 %      Neutrophils Absolute 4 14 Thousands/µL      Immature Grans Absolute 0 03 Thousand/uL      Lymphocytes Absolute 1 21 Thousands/µL      Monocytes Absolute 0 70 Thousand/µL      Eosinophils Absolute 0 08 Thousand/µL      Basophils Absolute 0 03 Thousands/µL                  XR chest 1 view portable   ED Interpretation by Denae Kim MD (02/13 1113)   nad      Final Result by Mayra Noyola MD (02/13 1713)      No focal consolidation, pleural effusion, or pneumothorax  Workstation performed: [id number]                    Procedures  Procedures         ED Course  ED Course as of 02/14/22 1534   Sun Feb 13, 2022   1113 Cxr portable- no old cxr for comparison -- sternotomy-- bibasilar pleural effusions- no free/sq air- no infiltrate- ptx/ pulm edema   1204 - er md note- pt tolerated ambulation in the er with no complaints- is 100 % ASYMPTOMATIC  UPON ER D/C                               SBIRT 22yo+      Most Recent Value   SBIRT (23 yo +)    In order to provide better care to our patients, we are screening all of our patients for alcohol and drug use  Would it be okay to ask you these screening questions? Yes Filed at: 02/13/2022 0950   Initial Alcohol Screen: US AUDIT-C     1  How often do you have a drink containing alcohol? 0 Filed at: 02/13/2022 0950   2  How many drinks containing alcohol do you have on a typical day you are drinking? 0 Filed at: 02/13/2022 0950   3b  FEMALE Any Age, or MALE 65+: How often do you have 4 or more drinks on one occassion? 0 Filed at: 02/13/2022 0950   Audit-C Score 0 Filed at: 02/13/2022 9006   BALDO: How many times in the past year have you    Used an illegal drug or used a prescription medication for non-medical reasons? Never Filed at: 02/13/2022 6553                    MDM    Disposition  Final diagnoses:   Lightheadedness   Chest pain, unspecified type   Nausea & vomiting     Time reflects when diagnosis was documented in both MDM as applicable and the Disposition within this note     Time User Action Codes Description Comment    2/13/2022 12:04 PM Riana Higgins Add [R42] Lightheadedness     2/13/2022 12:05 PM Jethro GARCIA Add [R07 9] Chest pain, unspecified type     2/13/2022 12:05 PM Riana Higgins Add [R11 2] Nausea & vomiting       ED Disposition     ED Disposition Condition Date/Time Comment    Discharge Stable [place] Feb 13, 2022 1204 [place] discharge to home/self care  Follow-up Information    None         Discharge Medication List as of 2/13/2022 12:09 PM      START taking these medications    Details   ondansetron (Zofran ODT) 4 mg disintegrating tablet Take 2 tablets (8 mg total) by mouth every 8 (eight) hours as needed for nausea or vomiting for up to 10 doses, Starting Sun 2/13/2022, Print         CONTINUE these medications which have NOT CHANGED    Details   BIOTIN PO Take 1 tablet by mouth daily, Historical Med      Calcium Carbonate (CALCIUM 600 PO) Take 1 tablet by mouth daily, Historical Med      Cholecalciferol (VITAMIN D) 2000 units CAPS Take 1 capsule by mouth daily, Historical Med      citalopram (CeleXA) 20 mg tablet Take 20 mg by mouth daily  , Historical Med      cyanocobalamin (VITAMIN B-12) 1,000 mcg tablet Take 1,000 mcg by mouth daily, Historical Med      fluocinolone acetonide (DERMOTIC) 0 01 % otic oil Administer 5 drops into both ears 2 (two) times a day as needed (Ear Itching), Starting Mon 11/18/2019, Normal      hydroxychloroquine (PLAQUENIL) 200 mg tablet Take 200 mg by mouth daily  , Historical Med      influenza vaccine, high-dose (Fluzone High-Dose Quadrivalent) 0 7 ML DEL Fluzone High-Dose Quad 2020-21 (PF) 240 mcg/0 7 mL IM syringe   ADM 0 7ML IM UTD, Historical Med      Multiple Vitamin (MULTIVITAMIN) capsule Take 1 capsule by mouth daily, Historical Med      omeprazole (PriLOSEC) 20 mg delayed release capsule Take 20 mg by mouth 2 (two) times a day  , Historical Med             No discharge procedures on file      PDMP Review       Value Time User    PDMP Reviewed  Yes 2/13/2021  4:18 PM Fernanda Olivares [place]          ED Provider  Electronically Signed by           Hai Briones MD  02/14/22 2786

## 2022-11-23 ENCOUNTER — TELEPHONE (OUTPATIENT)
Dept: GASTROENTEROLOGY | Facility: CLINIC | Age: 77
End: 2022-11-23

## 2022-11-23 NOTE — TELEPHONE ENCOUNTER
LMOM appt has been moved from [employer]   to [employer]    And to give us a call with any question or concerns

## 2022-11-28 ENCOUNTER — TELEPHONE (OUTPATIENT)
Dept: GASTROENTEROLOGY | Facility: CLINIC | Age: 77
End: 2022-11-28

## 2022-11-28 ENCOUNTER — OFFICE VISIT (OUTPATIENT)
Dept: GASTROENTEROLOGY | Facility: CLINIC | Age: 77
End: 2022-11-28

## 2022-11-28 VITALS
WEIGHT: 151 LBS | BODY MASS INDEX: 29.64 KG/M2 | SYSTOLIC BLOOD PRESSURE: 147 MMHG | DIASTOLIC BLOOD PRESSURE: 82 MMHG | HEART RATE: 73 BPM | HEIGHT: 60 IN

## 2022-11-28 DIAGNOSIS — Z90.3 STATUS POST SLEEVE GASTRECTOMY: Primary | ICD-10-CM

## 2022-11-28 DIAGNOSIS — R10.10 PAIN OF UPPER ABDOMEN: ICD-10-CM

## 2022-11-28 DIAGNOSIS — Z87.19 H/O HIATAL HERNIA: ICD-10-CM

## 2022-11-28 DIAGNOSIS — R13.19 ESOPHAGEAL DYSPHAGIA: ICD-10-CM

## 2022-11-28 RX ORDER — MECLIZINE HCL 12.5 MG/1
TABLET ORAL 3 TIMES DAILY PRN
COMMUNITY

## 2022-11-28 NOTE — PROGRESS NOTES
[facility] [address] Consultation  Mahi Klein 78 y o  female MRN: [id number]  Encounter: [id number]          ASSESSMENT AND PLAN:      1  Status post sleeve gastrectomy  -     EGD; Future; Expected date: 11/28/2022    2  H/O hiatal hernia  -     EGD; Future; Expected date: 11/28/2022    3  Pain of upper abdomen  -     EGD; Future; Expected date: 11/28/2022    4  Esophageal dysphagia  -     EGD; Future; Expected date: 11/28/2022      History of sleeve gastrectomy hiatal hernia repair, upper abdominal pain has now upper abdominal pain some difficulty swallowing nausea bloating burping, may have esophagitis, the hiatal hernia repair maybe too snug, has some indigestion bloating as well  Anti-reflux measures reviewed diet discussed eat smaller portions take time take PPI, schedule EGD, procedure and prep discussed at length  She tells me she is had screening for colorectal cancer done with Cologuard test, occult blood in the past was negative    ______________________________________________________________________    HPI:     Patient came in for evaluation of her upper abdominal pain, history of hiatal hernia, sensation of food sticking in the chest   She had a sleeve gastrectomy done 13 years ago by Dr Colin Eckert, she gave history of hiatal hernia and was told had hiatal hernia repair at the same time  Since then she is had some sensation of food sticking especially certain foods and if she eats too fast too much are large bite she has to throw up  Appetite has been fair weight now stable the lost 100 lb since sleeve  Denies any blood in stools hematemesis melena hematochezia, denies any chest pain shortness of breath fever chills rash  Diet medications more than 10 pertinent systems on the prior records were reviewed  REVIEW OF SYSTEMS:    CONSTITUTIONAL: Denies any fever, chills, rigors, and weight loss  HEENT: No earache or tinnitus    CARDIOVASCULAR: No chest pain or palpitations  RESPIRATORY: Denies any cough, hemoptysis, shortness of breath or dyspnea on exertion  GASTROINTESTINAL: As noted in the History of Present Illness  GENITOURINARY: Denies any hematuria or dysuria  NEUROLOGIC: No dizziness or vertigo  MUSCULOSKELETAL: Denies any joint swellings  SKIN: Denies skin rashes or itching  ENDOCRINE: Denies excessive thirst  Denies intolerance to heat or cold  PSYCHOSOCIAL: Denies depression or anxiety  Denies any recent memory loss         Historical Information   Past Medical History:   Diagnosis Date   • Full dentures    • GERD (gastroesophageal reflux disease)    • Hiatal hernia    • HL (hearing loss)    • Kidney stone    • RA (rheumatoid arthritis) ([facility] [address] )    • Rheumatoid arthritis ([facility] [address] )    • Sleep apnea     resolved since gastric sleeve surgery   • Sleep difficulties    • TMJ dysfunction    • Wears glasses      Past Surgical History:   Procedure Laterality Date   • ADENOIDECTOMY     • BARIATRIC SURGERY     • CHOLECYSTECTOMY     • GANGLION CYST EXCISION Left 12/15/2017    Procedure: 2ND INTERSPACE MORTONS NEUROMA EXCISION;  Surgeon: Pako Ren DPM;  Location: [facility];  Service: Podiatry   • HYSTERECTOMY     • LITHOTRIPSY     • PARATHYROID GLAND SURGERY      x2   • SLEEVE GASTROPLASTY     • TONSILLECTOMY       Social History   Social History     Substance and Sexual Activity   Alcohol Use Yes    Comment: socially     Social History     Substance and Sexual Activity   Drug Use No     Social History     Tobacco Use   Smoking Status Former   Smokeless Tobacco Never   Tobacco Comments    quit over 30 years     Family History   Problem Relation Age of Onset   • Hyperparathyroidism Father    • Hyperparathyroidism Sister    • Hyperparathyroidism Family        Meds/Allergies       Current Outpatient Medications:   •  BIOTIN PO  •  Calcium Carbonate (CALCIUM 600 PO)  •  Cholecalciferol (VITAMIN D) 2000 units CAPS  •  citalopram (CeleXA) 20 mg tablet  • cyanocobalamin (VITAMIN B-12) 1,000 mcg tablet  •  fluocinolone acetonide (DERMOTIC) 0 01 % otic oil  •  hydroxychloroquine (PLAQUENIL) 200 mg tablet  •  influenza vaccine, high-dose (Fluzone High-Dose Quadrivalent) 0 7 ML DEL  •  meclizine (ANTIVERT) 12 5 MG tablet  •  Multiple Vitamin (MULTIVITAMIN) capsule  •  omeprazole (PriLOSEC) 20 mg delayed release capsule  •  ondansetron (Zofran ODT) 4 mg disintegrating tablet    Allergies   Allergen Reactions   • Amoxicillin Anaphylaxis   • Penicillins Anaphylaxis   • Darvon [Propoxyphene]      Generalized numbness   • Demerol [Meperidine] Other (See Comments)     Generalized numbness   • Nsaids Other (See Comments)     S/p gastric surgery           Objective     Blood pressure 147/82, pulse 73, height 5' (1 524 m), weight 68 5 kg (151 lb)  Body mass index is 29 49 kg/m²  PHYSICAL EXAM:      General Appearance:   Alert, cooperative, no distress   HEENT:   Normocephalic, atraumatic, anicteric  Neck:  Supple, symmetrical, trachea midline   Lungs:   Clear to auscultation bilaterally; no rales, rhonchi or wheezing; respirations unlabored    Heart[de-identified]   Regular rate and rhythm; no murmur  Abdomen:   Soft, non-tender, non-distended; normal bowel sounds; no masses, no organomegaly    Genitalia:   Deferred    Rectal:   Deferred    Extremities:  No cyanosis, clubbing or edema    Skin:  No jaundice, rashes, or lesions    Lymph nodes:  No palpable cervical lymphadenopathy        Lab Results:   No visits with results within 1 Day(s) from this visit     Latest known visit with results is:   Admission on 02/13/2022, Discharged on 02/13/2022   Component Date Value   • WBC 02/13/2022 6 19    • RBC 02/13/2022 3 93    • Hemoglobin 02/13/2022 11 9    • Hematocrit 02/13/2022 37 3    • MCV 02/13/2022 95    • MCH 02/13/2022 30 3    • MCHC 02/13/2022 31 9    • RDW 02/13/2022 13 0    • MPV 02/13/2022 8 7 (L)    • Platelets 25/23/7101 275    • nRBC 02/13/2022 0    • Neutrophils Relative 02/13/2022 66    • Immat GRANS % 02/13/2022 1    • Lymphocytes Relative 02/13/2022 20    • Monocytes Relative 02/13/2022 11    • Eosinophils Relative 02/13/2022 1    • Basophils Relative 02/13/2022 1    • Neutrophils Absolute 02/13/2022 4 14    • Immature Grans Absolute 02/13/2022 0 03    • Lymphocytes Absolute 02/13/2022 1 21    • Monocytes Absolute 02/13/2022 0 70    • Eosinophils Absolute 02/13/2022 0 08    • Basophils Absolute 02/13/2022 0 03    • Sodium 02/13/2022 135 (L)    • Potassium 02/13/2022 4 6    • Chloride 02/13/2022 102    • CO2 02/13/2022 26    • ANION GAP 02/13/2022 7    • BUN 02/13/2022 14    • Creatinine 02/13/2022 0 92    • Glucose 02/13/2022 78    • Calcium 02/13/2022 10 1    • Corrected Calcium 02/13/2022 10 7 (H)    • AST 02/13/2022 23    • ALT 02/13/2022 14    • Alkaline Phosphatase 02/13/2022 47    • Total Protein 02/13/2022 6 7    • Albumin 02/13/2022 3 2 (L)    • Total Bilirubin 02/13/2022 0 48    • eGFR 02/13/2022 61    • HS TnI random 02/13/2022 3 (L)    • Ventricular Rate 02/13/2022 55    • Atrial Rate 02/13/2022 55    • WI Interval 02/13/2022 180    • QRSD Interval 02/13/2022 92    • QT Interval 02/13/2022 458    • QTC Interval 02/13/2022 438    • P Axis 02/13/2022 60    • QRS Axis 02/13/2022 -34    • T Wave [place] 02/13/2022 1          Radiology Results:   No results found

## 2022-11-28 NOTE — TELEPHONE ENCOUNTER
Scheduled date of EGD(as of today):11/30/22  Physician performing EGD:Kelsi  Location of EGD:[facility]  Instructions reviewed with patient by:Swathi UP  Clearances: None

## 2022-11-30 ENCOUNTER — ANESTHESIA (OUTPATIENT)
Dept: GASTROENTEROLOGY | Facility: AMBULATORY SURGERY CENTER | Age: 77
End: 2022-11-30

## 2022-11-30 ENCOUNTER — ANESTHESIA EVENT (OUTPATIENT)
Dept: GASTROENTEROLOGY | Facility: AMBULATORY SURGERY CENTER | Age: 77
End: 2022-11-30

## 2022-11-30 ENCOUNTER — HOSPITAL ENCOUNTER (OUTPATIENT)
Dept: GASTROENTEROLOGY | Facility: AMBULATORY SURGERY CENTER | Age: 77
Discharge: HOME | End: 2022-11-30

## 2022-11-30 VITALS
BODY MASS INDEX: 29.45 KG/M2 | DIASTOLIC BLOOD PRESSURE: 71 MMHG | RESPIRATION RATE: 18 BRPM | HEIGHT: 60 IN | HEART RATE: 59 BPM | TEMPERATURE: 96 F | WEIGHT: 150 LBS | OXYGEN SATURATION: 97 % | SYSTOLIC BLOOD PRESSURE: 139 MMHG

## 2022-11-30 DIAGNOSIS — R13.19 ESOPHAGEAL DYSPHAGIA: ICD-10-CM

## 2022-11-30 DIAGNOSIS — Z90.3 STATUS POST SLEEVE GASTRECTOMY: ICD-10-CM

## 2022-11-30 DIAGNOSIS — Z87.19 H/O HIATAL HERNIA: ICD-10-CM

## 2022-11-30 DIAGNOSIS — R10.10 PAIN OF UPPER ABDOMEN: ICD-10-CM

## 2022-11-30 RX ORDER — SODIUM CHLORIDE, SODIUM LACTATE, POTASSIUM CHLORIDE, CALCIUM CHLORIDE 600; 310; 30; 20 MG/100ML; MG/100ML; MG/100ML; MG/100ML
INJECTION, SOLUTION INTRAVENOUS CONTINUOUS PRN
Status: DISCONTINUED | OUTPATIENT
Start: 2022-11-30 | End: 2022-11-30

## 2022-11-30 RX ORDER — LIDOCAINE HYDROCHLORIDE 10 MG/ML
INJECTION, SOLUTION EPIDURAL; INFILTRATION; INTRACAUDAL; PERINEURAL AS NEEDED
Status: DISCONTINUED | OUTPATIENT
Start: 2022-11-30 | End: 2022-11-30

## 2022-11-30 RX ORDER — PROPOFOL 10 MG/ML
INJECTION, EMULSION INTRAVENOUS AS NEEDED
Status: DISCONTINUED | OUTPATIENT
Start: 2022-11-30 | End: 2022-11-30

## 2022-11-30 RX ORDER — SODIUM CHLORIDE 9 MG/ML
20 INJECTION, SOLUTION INTRAVENOUS CONTINUOUS
Status: DISCONTINUED | OUTPATIENT
Start: 2022-11-30 | End: 2022-12-04 | Stop reason: HOSPADM

## 2022-11-30 RX ADMIN — PROPOFOL 30 MG: 10 INJECTION, EMULSION INTRAVENOUS at 14:24

## 2022-11-30 RX ADMIN — PROPOFOL 100 MG: 10 INJECTION, EMULSION INTRAVENOUS at 14:20

## 2022-11-30 RX ADMIN — PROPOFOL 50 MG: 10 INJECTION, EMULSION INTRAVENOUS at 14:22

## 2022-11-30 RX ADMIN — SODIUM CHLORIDE, SODIUM LACTATE, POTASSIUM CHLORIDE, CALCIUM CHLORIDE: 600; 310; 30; 20 INJECTION, SOLUTION INTRAVENOUS at 14:18

## 2022-11-30 RX ADMIN — LIDOCAINE HYDROCHLORIDE 50 MG: 10 INJECTION, SOLUTION EPIDURAL; INFILTRATION; INTRACAUDAL; PERINEURAL at 14:20

## 2022-11-30 NOTE — ANESTHESIA PREPROCEDURE EVALUATION
Procedure:  EGD    Relevant Problems   ENDO   (+) Hyperparathyroidism (HCC)      /RENAL   (+) Atrophic kidney      GERD   Rheumatoid arthritis   Anxiety/depression  Dysphagia      Physical Exam    Airway    Mallampati score: II  TM Distance: >3 FB  Neck ROM: full     Dental   No notable dental hx     Cardiovascular      Pulmonary      Other Findings        Anesthesia Plan  ASA Score- 2     Anesthesia Type- IV sedation with anesthesia with ASA Monitors  Additional Monitors:   Airway Plan:           Plan Factors-Exercise tolerance (METS): >4 METS  Chart reviewed  Patient summary reviewed  Patient is not a current smoker  Obstructive sleep apnea risk education given perioperatively  Induction- intravenous  Postoperative Plan-     Informed Consent- Anesthetic plan and risks discussed with patient  I personally reviewed this patient with the CRNA  Discussed and agreed on the Anesthesia Plan with the CRNA  Braulio Preciado

## 2022-11-30 NOTE — ANESTHESIA POSTPROCEDURE EVALUATION
Post-Op Assessment Note    CV Status:  Stable  Pain Score: 0    Pain management: adequate     Mental Status:  Alert and awake   Hydration Status:  Euvolemic   PONV Controlled:  Controlled   Airway Patency:  Patent      Post Op Vitals Reviewed: Yes      Staff: Anesthesiologist, CRNA         No notable events documented      BP   111/63   Temp     Pulse  53   Resp 17   SpO2   96

## 2022-12-06 NOTE — RESULT ENCOUNTER NOTE
Please call the patient regarding her abnormal result  Biopsies show some mild inflammation, no infection or other abnormality    Follow up in my office as needed

## 2023-02-21 ENCOUNTER — HOSPITAL ENCOUNTER (EMERGENCY)
Facility: HOSPITAL | Age: 78
Discharge: HOME | End: 2023-02-21
Attending: EMERGENCY MEDICINE

## 2023-02-21 ENCOUNTER — APPOINTMENT (EMERGENCY)
Dept: RADIOLOGY | Facility: HOSPITAL | Age: 78
End: 2023-02-21

## 2023-02-21 ENCOUNTER — APPOINTMENT (EMERGENCY)
Dept: CT IMAGING | Facility: HOSPITAL | Age: 78
End: 2023-02-21

## 2023-02-21 VITALS
OXYGEN SATURATION: 98 % | HEART RATE: 90 BPM | DIASTOLIC BLOOD PRESSURE: 77 MMHG | TEMPERATURE: 97.8 F | RESPIRATION RATE: 14 BRPM | SYSTOLIC BLOOD PRESSURE: 114 MMHG

## 2023-02-21 DIAGNOSIS — R10.9 ACUTE RIGHT FLANK PAIN: Primary | ICD-10-CM

## 2023-02-21 DIAGNOSIS — N39.0 UTI (URINARY TRACT INFECTION): ICD-10-CM

## 2023-02-21 DIAGNOSIS — R53.83 FATIGUE: ICD-10-CM

## 2023-02-21 LAB
2HR DELTA HS TROPONIN: 0 NG/L
ALBUMIN SERPL BCP-MCNC: 3.9 G/DL (ref 3.5–5)
ALP SERPL-CCNC: 45 U/L (ref 34–104)
ALT SERPL W P-5'-P-CCNC: 11 U/L (ref 7–52)
ANION GAP SERPL CALCULATED.3IONS-SCNC: 8 MMOL/L (ref 4–13)
AST SERPL W P-5'-P-CCNC: 22 U/L (ref 13–39)
ATRIAL RATE: 77 BPM
BACTERIA UR QL AUTO: ABNORMAL /HPF
BASOPHILS # BLD AUTO: 0.07 THOUSANDS/ÂΜL (ref 0–0.1)
BASOPHILS NFR BLD AUTO: 1 % (ref 0–1)
BILIRUB SERPL-MCNC: 0.46 MG/DL (ref 0.2–1)
BILIRUB UR QL STRIP: NEGATIVE
BNP SERPL-MCNC: 57 PG/ML (ref 0–100)
BUN SERPL-MCNC: 12 MG/DL (ref 5–25)
CALCIUM SERPL-MCNC: 9.9 MG/DL (ref 8.4–10.2)
CARDIAC TROPONIN I PNL SERPL HS: 5 NG/L
CARDIAC TROPONIN I PNL SERPL HS: 5 NG/L
CHLORIDE SERPL-SCNC: 101 MMOL/L (ref 96–108)
CLARITY UR: CLEAR
CO2 SERPL-SCNC: 27 MMOL/L (ref 21–32)
COLOR UR: YELLOW
CREAT SERPL-MCNC: 1.01 MG/DL (ref 0.6–1.3)
EOSINOPHIL # BLD AUTO: 0.11 THOUSAND/ÂΜL (ref 0–0.61)
EOSINOPHIL NFR BLD AUTO: 2 % (ref 0–6)
ERYTHROCYTE [DISTWIDTH] IN BLOOD BY AUTOMATED COUNT: 13.6 % (ref 11.6–15.1)
GFR SERPL CREATININE-BSD FRML MDRD: 54 ML/MIN/1.73SQ M
GLUCOSE SERPL-MCNC: 89 MG/DL (ref 65–140)
GLUCOSE UR STRIP-MCNC: NEGATIVE MG/DL
HCT VFR BLD AUTO: 42.8 % (ref 34.8–46.1)
HGB BLD-MCNC: 13.3 G/DL (ref 11.5–15.4)
HGB UR QL STRIP.AUTO: NEGATIVE
IMM GRANULOCYTES # BLD AUTO: 0.02 THOUSAND/UL (ref 0–0.2)
IMM GRANULOCYTES NFR BLD AUTO: 0 % (ref 0–2)
KETONES UR STRIP-MCNC: NEGATIVE MG/DL
LEUKOCYTE ESTERASE UR QL STRIP: NEGATIVE
LYMPHOCYTES # BLD AUTO: 2.22 THOUSANDS/ÂΜL (ref 0.6–4.47)
LYMPHOCYTES NFR BLD AUTO: 31 % (ref 14–44)
MCH RBC QN AUTO: 30 PG (ref 26.8–34.3)
MCHC RBC AUTO-ENTMCNC: 31.1 G/DL (ref 31.4–37.4)
MCV RBC AUTO: 96 FL (ref 82–98)
MONOCYTES # BLD AUTO: 0.96 THOUSAND/ÂΜL (ref 0.17–1.22)
MONOCYTES NFR BLD AUTO: 14 % (ref 4–12)
NEUTROPHILS # BLD AUTO: 3.7 THOUSANDS/ÂΜL (ref 1.85–7.62)
NEUTS SEG NFR BLD AUTO: 52 % (ref 43–75)
NITRITE UR QL STRIP: POSITIVE
NON-SQ EPI CELLS URNS QL MICRO: ABNORMAL /HPF
NRBC BLD AUTO-RTO: 0 /100 WBCS
P AXIS: 63 DEGREES
PH UR STRIP.AUTO: 7.5 [PH]
PLATELET # BLD AUTO: 404 THOUSANDS/UL (ref 149–390)
PMV BLD AUTO: 9.1 FL (ref 8.9–12.7)
POTASSIUM SERPL-SCNC: 3.8 MMOL/L (ref 3.5–5.3)
PR INTERVAL: 160 MS
PROT SERPL-MCNC: 7.1 G/DL (ref 6.4–8.4)
PROT UR STRIP-MCNC: ABNORMAL MG/DL
QRS AXIS: -39 DEGREES
QRSD INTERVAL: 94 MS
QT INTERVAL: 402 MS
QTC INTERVAL: 454 MS
RBC # BLD AUTO: 4.44 MILLION/UL (ref 3.81–5.12)
RBC #/AREA URNS AUTO: ABNORMAL /HPF
SODIUM SERPL-SCNC: 136 MMOL/L (ref 135–147)
SP GR UR STRIP.AUTO: 1.02 (ref 1–1.03)
T WAVE AXIS: 31 DEGREES
UROBILINOGEN UR STRIP-ACNC: <2 MG/DL
VENTRICULAR RATE: 77 BPM
WBC # BLD AUTO: 7.08 THOUSAND/UL (ref 4.31–10.16)
WBC #/AREA URNS AUTO: ABNORMAL /HPF

## 2023-02-21 RX ORDER — ACETAMINOPHEN 500 MG
500 TABLET ORAL EVERY 6 HOURS PRN
COMMUNITY

## 2023-02-21 RX ORDER — SULFAMETHOXAZOLE AND TRIMETHOPRIM 800; 160 MG/1; MG/1
1 TABLET ORAL EVERY 12 HOURS SCHEDULED
COMMUNITY

## 2023-02-21 RX ORDER — MULTIVIT WITH MINERALS/LUTEIN
1000 TABLET ORAL DAILY
COMMUNITY

## 2023-02-21 RX ADMIN — IOHEXOL 85 ML: 350 INJECTION, SOLUTION INTRAVENOUS at 13:33

## 2023-02-21 NOTE — ED PROVIDER NOTES
History  Chief Complaint   Patient presents with   • Back Pain     Pt presents to the ED with c/o right sided back pain  [facility] and told she had a UTI, took 2 doses of antibiotics  51-year-old female presents to the emergency department with complaints of back and abdominal pain  States that she has had some pain in the right upper part of her back which radiates around to the right side of the upper abdomen and slightly under her rib over the past 2 days  Seen in urgent care yesterday and was diagnosed with a urinary tract infection  States that she has taken 2 doses of her antibiotic thus far  States that she also had an x-ray done of the abdomen yesterday which was negative for kidney stone  Patient denies any fevers, chills, nausea, vomiting, or diarrhea  Additionally notes that she has become more easily fatigued with activity  States that when going shopping or walking from the waiting room to her exam room she became easily fatigued  Denies associated shortness of breath  No chest discomfort  Reports that this is new for her  Does have some swelling lower extremities but no history of congestive heart failure  Notes that her left leg is always slightly more swollen than the right  History provided by:  Patient   used: No        Prior to Admission Medications   Prescriptions Last Dose Informant Patient Reported? Taking?    Ascorbic Acid (vitamin C) 1000 MG tablet   Yes Yes   Sig: Take 1,000 mg by mouth daily   BIOTIN PO 2/21/2023  Yes Yes   Sig: Take 1 tablet by mouth daily   Calcium Carbonate (CALCIUM 600 PO) 2/21/2023  Yes Yes   Sig: Take 1 tablet by mouth daily   Cholecalciferol (VITAMIN D) 2000 units CAPS 2/21/2023  Yes Yes   Sig: Take 1 capsule by mouth daily   Multiple Vitamin (MULTIVITAMIN) capsule 2/21/2023  Yes Yes   Sig: Take 1 capsule by mouth daily   acetaminophen (TYLENOL) 500 mg tablet Past Month  Yes Yes   Sig: Take 500 mg by mouth every 6 (six) hours as needed for mild pain   citalopram (CeleXA) 20 mg tablet 2/21/2023  Yes Yes   Sig: Take 20 mg by mouth daily  cyanocobalamin (VITAMIN B-12) 1,000 mcg tablet 2/21/2023  Yes Yes   Sig: Take 1,000 mcg by mouth daily   hydroxychloroquine (PLAQUENIL) 200 mg tablet 2/21/2023  Yes Yes   Sig: Take 200 mg by mouth 2 (two) times a day with meals   meclizine (ANTIVERT) 12 5 MG tablet Past Month  Yes Yes   Sig: Take by mouth 3 (three) times a day as needed for dizziness   omeprazole (PriLOSEC) 20 mg delayed release capsule 2/21/2023  Yes Yes   Sig: Take 20 mg by mouth 2 (two) times a day     sulfamethoxazole-trimethoprim (Bactrim DS) 800-160 mg per tablet 2/21/2023  Yes Yes   Sig: Take 1 tablet by mouth every 12 (twelve) hours      Facility-Administered Medications: None       Past Medical History:   Diagnosis Date   • Anemia     slight   • Anxiety    • Depression    • Dysphagia    • Full dentures    • GERD (gastroesophageal reflux disease)    • Hiatal hernia    • HL (hearing loss)    • Kidney stone    • Osteoporosis    • RA (rheumatoid arthritis) ([facility] [address] )    • Rheumatoid arthritis ([facility] [address] )    • Sleep apnea     resolved since gastric sleeve surgery   • Sleep difficulties    • TMJ dysfunction    • Wears glasses        Past Surgical History:   Procedure Laterality Date   • ADENOIDECTOMY     • BARIATRIC SURGERY     • CHOLECYSTECTOMY     • GANGLION CYST EXCISION Left 12/15/2017    Procedure: 2ND INTERSPACE MORTONS NEUROMA EXCISION;  Surgeon: Antoine Merlos DPM;  Location: [facility];  Service: Podiatry   • HYSTERECTOMY     • LITHOTRIPSY     • PARATHYROID GLAND SURGERY      x2   • SLEEVE GASTROPLASTY     • TONSILLECTOMY     • UPPER GASTROINTESTINAL ENDOSCOPY         Family History   Problem Relation Age of Onset   • Hyperparathyroidism Father    • Hyperparathyroidism Sister    • Hyperparathyroidism Family      I have reviewed and agree with the history as documented      E-Cigarette/Vaping   • E-Cigarette Use Never User E-Cigarette/Vaping Substances   • Nicotine No    • THC No    • CBD No    • Flavoring No    • Other No    • Unknown No      Social History     Tobacco Use   • Smoking status: Former   • Smokeless tobacco: Never   • Tobacco comments:     quit over 30 years   Vaping Use   • Vaping Use: Never used   Substance Use Topics   • Alcohol use: Yes     Comment: socially   • Drug use: No       Review of Systems   Constitutional: Positive for fatigue  Negative for activity change, appetite change, chills and fever  HENT: Negative for congestion, dental problem, drooling, ear discharge, ear pain, mouth sores, nosebleeds, rhinorrhea, sore throat and trouble swallowing  Eyes: Negative for pain, discharge and itching  Respiratory: Negative for cough, chest tightness, shortness of breath and wheezing  Cardiovascular: Negative for chest pain and palpitations  Gastrointestinal: Negative for abdominal pain, blood in stool, constipation, diarrhea, nausea and vomiting  Endocrine: Negative for cold intolerance and heat intolerance  Genitourinary: Positive for flank pain  Negative for difficulty urinating, dysuria, frequency and urgency  Skin: Negative for rash and wound  Allergic/Immunologic: Negative for food allergies and immunocompromised state  Neurological: Negative for dizziness, seizures, syncope, weakness, numbness and headaches  Psychiatric/Behavioral: Negative for agitation, behavioral problems and confusion  Physical Exam  Physical Exam  Vitals and nursing note reviewed  Constitutional:       General: She is not in acute distress  Appearance: She is not diaphoretic  HENT:      Head: Normocephalic and atraumatic  Right Ear: External ear normal       Left Ear: External ear normal       Mouth/Throat:      Mouth: Mucous membranes are moist    Eyes:      Conjunctiva/sclera: Conjunctivae normal    Neck:      Vascular: No JVD  Trachea: No tracheal deviation     Cardiovascular:      Rate and Rhythm: Normal rate and regular rhythm  Heart sounds: Normal heart sounds  No murmur heard  No friction rub  No gallop  Pulmonary:      Effort: Pulmonary effort is normal  No respiratory distress  Breath sounds: Normal breath sounds  No wheezing or rales  Chest:      Chest wall: No tenderness  Abdominal:      General: Bowel sounds are normal  There is no distension  Palpations: Abdomen is soft  Tenderness: There is no abdominal tenderness  There is no guarding  Musculoskeletal:         General: No tenderness or deformity  Normal range of motion  Right lower leg: Right lower leg edema: trace  Left lower le+ Edema present  Lymphadenopathy:      Cervical: No cervical adenopathy  Skin:     General: Skin is warm and dry  Findings: No erythema or rash  Neurological:      General: No focal deficit present  Mental Status: She is alert and oriented to person, place, and time     Psychiatric:         Mood and Affect: Mood normal          Behavior: Behavior normal          Vital Signs  ED Triage Vitals [23 1024]   Temperature Pulse Respirations Blood Pressure SpO2   97 8 °F (36 6 °C) 90 14 114/77 98 %      Temp Source Heart Rate Source Patient Position - Orthostatic VS BP Location FiO2 (%)   Oral Monitor -- Right arm --      Pain Score       --           Vitals:    23 1024   BP: 114/77   Pulse: 90         Visual Acuity      ED Medications  Medications   iohexol (OMNIPAQUE) 350 MG/ML injection (SINGLE-DOSE) 85 mL (85 mL Intravenous Given 23 1333)       Diagnostic Studies  Results Reviewed     Procedure Component Value Units Date/Time    Urine Microscopic [[id number]]  (Abnormal) Collected: 23 1350    Lab Status: Final result Specimen: Urine, Clean Catch Updated: 23 1416     RBC, UA 1-2 /hpf      WBC, UA 10-20 /hpf      Epithelial Cells Occasional /hpf      Bacteria, UA None Seen /hpf     Urine culture [[id number]] Collected: 23 1350 Lab Status:  In process Specimen: Urine, Clean Catch Updated: 02/21/23 1416    UA w Reflex to Microscopic w Reflex to Culture [[id number]]  (Abnormal) Collected: 02/21/23 1350    Lab Status: Final result Specimen: Urine, Clean Catch Updated: 02/21/23 1412     Color, UA Yellow     Clarity, UA Clear     Specific Gravity, UA 1 018     pH, UA 7 5     Leukocytes, UA Negative     Nitrite, UA Positive     Protein, UA Trace mg/dl      Glucose, UA Negative mg/dl      Ketones, UA Negative mg/dl      Urobilinogen, UA <2 0 mg/dl      Bilirubin, UA Negative     Occult Blood, UA Negative    HS Troponin I 2hr [[id number]]  (Normal) Collected: 02/21/23 1307    Lab Status: Final result Specimen: Blood from Arm, Right Updated: 02/21/23 1340     hs TnI 2hr 5 ng/L      Delta 2hr hsTnI 0 ng/L     HS Troponin I 4hr [[id number]]     Lab Status: No result Specimen: Blood     HS Troponin 0hr (reflex protocol) [[id number]]  (Normal) Collected: 02/21/23 1054    Lab Status: Final result Specimen: Blood from Arm, Right Updated: 02/21/23 1222     hs TnI 0hr 5 ng/L     B-Type Natriuretic Peptide(BNP) [[id number]]  (Normal) Collected: 02/21/23 1054    Lab Status: Final result Specimen: Blood from Arm, Right Updated: 02/21/23 1204     BNP 57 pg/mL     Comprehensive metabolic panel [[id number]] Collected: 02/21/23 1054    Lab Status: Final result Specimen: Blood from Arm, Right Updated: 02/21/23 1132     Sodium 136 mmol/L      Potassium 3 8 mmol/L      Chloride 101 mmol/L      CO2 27 mmol/L      ANION GAP 8 mmol/L      BUN 12 mg/dL      Creatinine 1 01 mg/dL      Glucose 89 mg/dL      Calcium 9 9 mg/dL      AST 22 U/L      ALT 11 U/L      Alkaline Phosphatase 45 U/L      Total Protein 7 1 g/dL      Albumin 3 9 g/dL      Total Bilirubin 0 46 mg/dL      eGFR 54 ml/min/1 73sq m     Narrative:      [facility] guidelines for Chronic Kidney Disease (CKD):   •  Stage 1 with normal or high GFR (GFR > 90 mL/min/1 73 square meters)  • Stage 2 Mild CKD (GFR = 60-89 mL/min/1 73 square meters)  •  Stage 3A Moderate CKD (GFR = 45-59 mL/min/1 73 square meters)  •  Stage 3B Moderate CKD (GFR = 30-44 mL/min/1 73 square meters)  •  Stage 4 Severe CKD (GFR = 15-29 mL/min/1 73 square meters)  •  Stage 5 End Stage CKD (GFR <15 mL/min/1 73 square meters)  Note: GFR calculation is accurate only with a steady state creatinine    CBC and differential [[id number]]  (Abnormal) Collected: 02/21/23 1054    Lab Status: Final result Specimen: Blood from Arm, Right Updated: 02/21/23 1116     WBC 7 08 Thousand/uL      RBC 4 44 Million/uL      Hemoglobin 13 3 g/dL      Hematocrit 42 8 %      MCV 96 fL      MCH 30 0 pg      MCHC 31 1 g/dL      RDW 13 6 %      MPV 9 1 fL      Platelets 417 Thousands/uL      nRBC 0 /100 WBCs      Neutrophils Relative 52 %      Immat GRANS % 0 %      Lymphocytes Relative 31 %      Monocytes Relative 14 %      Eosinophils Relative 2 %      Basophils Relative 1 %      Neutrophils Absolute 3 70 Thousands/µL      Immature Grans Absolute 0 02 Thousand/uL      Lymphocytes Absolute 2 22 Thousands/µL      Monocytes Absolute 0 96 Thousand/µL      Eosinophils Absolute 0 11 Thousand/µL      Basophils Absolute 0 07 Thousands/µL                  CT abdomen pelvis with contrast   Final Result by Laura Bhatt MD (02/21 1426)      No acute abdominopelvic pathology            Workstation performed: [id number]         XR chest 2 views   Final Result by Felipe Avila MD (02/21 1216)      No definite acute disease  Mild chronic increase in interstitial markings, question mild pulmonary fibrosis              Workstation performed: [id number]                    Procedures  ECG 12 Lead Documentation Only    Date/Time: 2/21/2023 12:43 PM  Performed by: Shree Ruiz PA-C  Authorized by: Shree Ruiz PA-C     Indications / Diagnosis:  Easy fatigue  ECG reviewed by me, the ED Provider: yes    Patient location:  ED  Previous ECG:     Previous ECG: Compared to current    Comparison ECG info:  2/13/22    Similarity:  Changes noted  Interpretation:     Interpretation: abnormal    Rate:     ECG rate:  76    ECG rate assessment: normal    Rhythm:     Rhythm: sinus rhythm    Ectopy:     Ectopy: none    QRS:     QRS axis:  Left    QRS intervals:  Normal  Conduction:     Conduction: normal    ST segments:     ST segments:  Normal  T waves:     T waves: non-specific               ED Course  ED Course as of 02/21/23 1616   Tue Feb 21, 2023   1410 Call placed to reading regarding CT reading  They tell me that the study was never verified  We will reach out to CT department so that study can be read  HEART Risk Score    Flowsheet Row Most Recent Value   Heart Score Risk Calculator    History 0 Filed at: 02/21/2023 1348   ECG 0 Filed at: 02/21/2023 1348   Age 2 Filed at: 02/21/2023 1348   Risk Factors 0 Filed at: 02/21/2023 1348   Troponin 0 Filed at: 02/21/2023 1348   HEART Score 2 Filed at: 02/21/2023 1348                                      Medical Decision Making  Differential diagnosis includes but not limited to: Urinary tract infection, pyelonephritis, kidney stone, sepsis, anemia, congestive heart failure, ACS    Acute right flank pain: acute illness or injury  Fatigue: acute illness or injury  UTI (urinary tract infection): acute illness or injury  Amount and/or Complexity of Data Reviewed  Labs: ordered  Radiology: ordered  Risk  Prescription drug management            Disposition  Final diagnoses:   Acute right flank pain   UTI (urinary tract infection)   Fatigue - with exertion     Time reflects when diagnosis was documented in both MDM as applicable and the Disposition within this note     Time User Action Codes Description Comment    2/21/2023  2:37 PM Shreyas Ochoa Add [R10 9] Acute right flank pain     2/21/2023  2:37 PM Shreyas Ochoa Add [N39 0] UTI (urinary tract infection)     2/21/2023  2:38 PM Shreyas Ochoa Add [R29 818] Easy fatigue of eyes     2/21/2023  2:38 PM Carl Ochoa Remove [R29 818] Easy fatigue of eyes     2/21/2023  2:38 PM Dona Wilson Add [R53 83] Fatigue     2/21/2023  2:39 PM Carl Ochoa Modify [R53 83] Fatigue with exertion      ED Disposition     ED Disposition   Discharge    Condition   Stable    Date/Time   Tue Feb 21, 2023  2:35 PM    [address]  82 discharge to home/self care  Follow-up Information     Follow up With Specialties Details Why [address], [place] Internal Medicine   [address]  [address] [place] [address]  [phone]            Discharge Medication List as of 2/21/2023  2:39 PM      CONTINUE these medications which have NOT CHANGED    Details   acetaminophen (TYLENOL) 500 mg tablet Take 500 mg by mouth every 6 (six) hours as needed for mild pain, Historical Med      Ascorbic Acid (vitamin C) 1000 MG tablet Take 1,000 mg by mouth daily, Historical Med      BIOTIN PO Take 1 tablet by mouth daily, Historical Med      Calcium Carbonate (CALCIUM 600 PO) Take 1 tablet by mouth daily, Historical Med      Cholecalciferol (VITAMIN D) 2000 units CAPS Take 1 capsule by mouth daily, Historical Med      citalopram (CeleXA) 20 mg tablet Take 20 mg by mouth daily  , Historical Med      cyanocobalamin (VITAMIN B-12) 1,000 mcg tablet Take 1,000 mcg by mouth daily, Historical Med      hydroxychloroquine (PLAQUENIL) 200 mg tablet Take 200 mg by mouth 2 (two) times a day with meals, Historical Med      meclizine (ANTIVERT) 12 5 MG tablet Take by mouth 3 (three) times a day as needed for dizziness, Historical Med      Multiple Vitamin (MULTIVITAMIN) capsule Take 1 capsule by mouth daily, Historical Med      omeprazole (PriLOSEC) 20 mg delayed release capsule Take 20 mg by mouth 2 (two) times a day  , Historical Med      sulfamethoxazole-trimethoprim (Bactrim DS) 800-160 mg per tablet Take 1 tablet by mouth every 12 (twelve) hours, Historical Med             No discharge procedures on file      PDMP Review       Value Time User    PDMP Reviewed  Yes 2/13/2021  4:18 PM Scott Sneed, [address]          ED Provider  Electronically Signed by           Rebecca Iqbal PA-C  02/21/23 8509

## 2023-02-22 LAB — BACTERIA UR CULT: NORMAL

## 2023-02-23 ENCOUNTER — TELEPHONE (OUTPATIENT)
Dept: GASTROENTEROLOGY | Facility: CLINIC | Age: 78
End: 2023-02-23

## 2023-02-23 NOTE — TELEPHONE ENCOUNTER
Received referral from Dr Rashad Amaya to call and schedule patient with Dr Saleem Beaulieu for GERD  Left message for patient asking her to call and schedule  Will call her again if she doesn't return call to schedule  Thank you!

## 2023-03-02 NOTE — TELEPHONE ENCOUNTER
Left message for patient to call and schedule an appointment with Dr Yuriy Beach for [place]  Will try one more time in two weeks if she doesn't return call to schedule

## 2023-03-02 NOTE — TELEPHONE ENCOUNTER
Patients GI provider:  Dr Anna Blount    Number to return call: [phone]    Reason for call: Pt Returned call and states she was seen in November and had EGD  She does not need an appt       Scheduled procedure/appointment date if applicable: Apt/procedure 11/2022

## 2023-06-21 ENCOUNTER — HOSPITAL ENCOUNTER (EMERGENCY)
Facility: HOSPITAL | Age: 78
Discharge: HOME | End: 2023-06-21
Attending: EMERGENCY MEDICINE
Payer: COMMERCIAL

## 2023-06-21 ENCOUNTER — APPOINTMENT (EMERGENCY)
Dept: RADIOLOGY | Facility: HOSPITAL | Age: 78
End: 2023-06-21
Payer: COMMERCIAL

## 2023-06-21 VITALS
HEIGHT: 60 IN | RESPIRATION RATE: 20 BRPM | WEIGHT: 155.65 LBS | SYSTOLIC BLOOD PRESSURE: 167 MMHG | TEMPERATURE: 98 F | DIASTOLIC BLOOD PRESSURE: 78 MMHG | OXYGEN SATURATION: 96 % | BODY MASS INDEX: 30.56 KG/M2 | HEART RATE: 75 BPM

## 2023-06-21 DIAGNOSIS — S46.002A INJURY OF LEFT ROTATOR CUFF, INITIAL ENCOUNTER: Primary | ICD-10-CM

## 2023-06-21 PROCEDURE — 99284 EMERGENCY DEPT VISIT MOD MDM: CPT

## 2023-06-21 PROCEDURE — 73030 X-RAY EXAM OF SHOULDER: CPT

## 2023-06-21 NOTE — ED PROVIDER NOTES
History  Chief Complaint   Patient presents with   • Fall     Tripped over dog last week  Nya Dinning hit L side on bench states still unable to move arm   -thinners-asa -headstrike            79-year-old female presents 1 week after mechanical fall, she tripped over her dog, landed onto her left shoulder, has been unable to move her left shoulder since  No other areas of injury  No head injury no neck pain no chest pain no other extremity injury      History provided by:  Patient  Fall  Mechanism of injury: fall    Fall:     Fall occurred:  Tripped and walking (tripped over her dog)    Height of fall:  Standing    Impact surface:  Hard floor    Point of impact: left shoulder  Entrapped after fall: no    Suspicion of alcohol use: no    Suspicion of drug use: no    Prior to arrival data:     Bystander interventions:  None    Blood loss:  None    Responsiveness at scene:  Alert    Orientation at scene:  Person, place, situation and time    Loss of consciousness: no      Amnesic to event: no      IV access status:  None    Medications administered:  None    Immobilization:  None  Current pain details:     Pain quality:  Aching    Pain Severity:  Moderate    Pain timing:  Constant  Associated symptoms: no back pain, no blindness, no difficulty breathing, no headaches, no nausea and no neck pain        Prior to Admission Medications   Prescriptions Last Dose Informant Patient Reported? Taking?    Ascorbic Acid (vitamin C) 1000 MG tablet   Yes Yes   Sig: Take 1,000 mg by mouth daily   BIOTIN PO  Self Yes Yes   Sig: Take 1 tablet by mouth daily   Calcium Carbonate (CALCIUM 600 PO)  Self Yes Yes   Sig: Take 1 tablet by mouth daily   Cholecalciferol (VITAMIN D) 2000 units CAPS  Self Yes Yes   Sig: Take 1 capsule by mouth daily   Multiple Vitamin (MULTIVITAMIN) capsule  Self Yes Yes   Sig: Take 1 capsule by mouth daily   acetaminophen (TYLENOL) 500 mg tablet   Yes Yes   Sig: Take 500 mg by mouth every 6 (six) hours as needed for mild pain   citalopram (CeleXA) 20 mg tablet  Self Yes Yes   Sig: Take 20 mg by mouth daily  cyanocobalamin (VITAMIN B-12) 1,000 mcg tablet  Self Yes Yes   Sig: Take 1,000 mcg by mouth daily   hydroxychloroquine (PLAQUENIL) 200 mg tablet  Self Yes Yes   Sig: Take 200 mg by mouth 2 (two) times a day with meals   meclizine (ANTIVERT) 12 5 MG tablet  Self Yes Yes   Sig: Take by mouth 3 (three) times a day as needed for dizziness   omeprazole (PriLOSEC) 20 mg delayed release capsule  Self Yes Yes   Sig: Take 20 mg by mouth 2 (two) times a day        Facility-Administered Medications: None       Past Medical History:   Diagnosis Date   • Anemia     slight   • Anxiety    • Depression    • Dysphagia    • Full dentures    • GERD (gastroesophageal reflux disease)    • Hiatal hernia    • HL (hearing loss)    • Kidney stone    • Osteoporosis    • RA [facility]rhe[facility]oid arthritis) ([facility])    • Rheumatoid arthritis (HCC)    • Sleep apnea     resolved since gastric sleeve surgery   • Sleep difficulties    • TMJ dysfunction    • Wears glasses        Past Surgical History:   Procedure Laterality Date   • ADENOIDECTOMY     • BARIATRIC SURGERY     • CHOLECYSTECTOMY     • GANGLION CYST EXCISION Left 12/15/2017    Procedure: 2ND INTERSPACE MORTONS NEUROMA EXCISION;  Surgeon: Levi De La Torre DPM;  Location: [facility];  Service: Podiatry   • HYSTERECTOMY     • LITHOTRIPSY     • PARATHYROID GLAND SURGERY      x2   • SLEEVE GASTROPLASTY     • TONSILLECTOMY     • UPPER GASTROINTESTINAL ENDOSCOPY         Family History   Problem Relation Age of Onset   • Hyperparathyroidism Father    • Hyperparathyroidism Sister    • Hyperparathyroidism Family      I have reviewed and agree with the history as documented      E-Cigarette/Vaping   • E-Cigarette Use Never User      E-Cigarette/Vaping Substances   • Nicotine No    • THC No    • CBD No    • Flavoring No    • Other No    • Unknown No      Social History     Tobacco Use   • Smoking status: Former   • Smokeless tobacco: Never   • Tobacco comments:     quit over 30 years   Vaping Use   • Vaping Use: Never used   Substance Use Topics   • Alcohol use: Yes     Comment: socially   • Drug use: No       Review of Systems   Eyes: Negative for blindness  Gastrointestinal: Negative for nausea  Musculoskeletal: Negative for back pain and neck pain  Neurological: Negative for headaches  Physical Exam  Physical Exam    Vital Signs  ED Triage Vitals [06/21/23 1034]   Temperature Pulse Respirations Blood Pressure SpO2   98 °F (36 7 °C) 75 20 167/78 96 %      Temp src Heart Rate Source Patient Position - Orthostatic VS BP Location FiO2 (%)   -- -- -- -- --      Pain Score       10 - Worst Possible Pain           Vitals:    06/21/23 1034   BP: 167/78   Pulse: 75         Visual Acuity      ED Medications  Medications - No data to display    Diagnostic Studies  Results Reviewed     None                 XR shoulder 2+ views LEFT   ED Interpretation by Claire Grant DO (06/21 1123)   No acute fracture, osteopenia, osteoarthritis                 Procedures  Procedures         ED Course                                             Medical Decision Making        Initial ED assessment: 82-year-old female, mechanical fall from standing 1 week ago, now unable to elevate left shoulder    Initial DDx includes but is not limited to: Shoulder fracture, shoulder dislocation, rotator cuff injury, contusion    Initial ED plan:   X-ray        Final ED summary/disposition:   After evaluation and workup in the emergency department, x-ray without evidence of fracture, likely rotator cuff injury, placed in a sling will follow with orthopedics     Amount and/or Complexity of Data Reviewed  Radiology: ordered            Disposition  Final diagnoses:   Injury of left rotator cuff, initial encounter     Time reflects when diagnosis was documented in both MDM as applicable and the Disposition within this note     Time User Action Codes Description Comment    6/21/2023 11:18 AM Michaela Nava Add [S46 002A] Injury of left rotator cuff, initial encounter       ED Disposition     ED Disposition   Discharge    Condition   Stable    Date/Time   Wed Jun 21, 2023 11:18 AM    Comment   Christiano Grade discharge to home/self care  Follow-up Information     Follow up With Specialties Details Why Contact Info Additional [address] [facility] [place] [facility] Call today To arrange for the next available appointment [address] [phone]  [place] [address], [address] [place] [address], [phone]          Patient's Medications   Discharge Prescriptions    No medications on file       No discharge procedures on file      PDMP Review       Value Time User    PDMP Reviewed  Yes 2/13/2021  4:18 PM Jeffie Barthel, 10 [facility]          ED Provider  Electronically Signed by           Alison Martin DO  06/21/23 112

## 2023-07-03 ENCOUNTER — OFFICE VISIT (OUTPATIENT)
Dept: OBGYN CLINIC | Facility: CLINIC | Age: 78
End: 2023-07-03
Payer: COMMERCIAL

## 2023-07-03 VITALS
DIASTOLIC BLOOD PRESSURE: 69 MMHG | HEIGHT: 60 IN | HEART RATE: 76 BPM | WEIGHT: 155.1 LBS | BODY MASS INDEX: 30.45 KG/M2 | RESPIRATION RATE: 16 BRPM | SYSTOLIC BLOOD PRESSURE: 120 MMHG

## 2023-07-03 DIAGNOSIS — M67.912 DYSFUNCTION OF LEFT ROTATOR CUFF: Primary | ICD-10-CM

## 2023-07-03 PROCEDURE — 99214 OFFICE O/P EST MOD 30 MIN: CPT | Performed by: STUDENT IN AN ORGANIZED HEALTH CARE EDUCATION/TRAINING PROGRAM

## 2023-07-03 PROCEDURE — 1124F ACP DISCUSS-NO DSCNMKR DOCD: CPT | Performed by: STUDENT IN AN ORGANIZED HEALTH CARE EDUCATION/TRAINING PROGRAM

## 2023-07-03 PROCEDURE — 20610 DRAIN/INJ JOINT/BURSA W/O US: CPT | Performed by: STUDENT IN AN ORGANIZED HEALTH CARE EDUCATION/TRAINING PROGRAM

## 2023-07-03 RX ORDER — BUPIVACAINE HYDROCHLORIDE 2.5 MG/ML
4 INJECTION, SOLUTION INFILTRATION; PERINEURAL
Status: COMPLETED | OUTPATIENT
Start: 2023-07-03 | End: 2023-07-03

## 2023-07-03 RX ORDER — TRIAMCINOLONE ACETONIDE 40 MG/ML
40 INJECTION, SUSPENSION INTRA-ARTICULAR; INTRAMUSCULAR
Status: COMPLETED | OUTPATIENT
Start: 2023-07-03 | End: 2023-07-03

## 2023-07-03 RX ADMIN — TRIAMCINOLONE ACETONIDE 40 MG: 40 INJECTION, SUSPENSION INTRA-ARTICULAR; INTRAMUSCULAR at 13:30

## 2023-07-03 RX ADMIN — BUPIVACAINE HYDROCHLORIDE 4 ML: 2.5 INJECTION, SOLUTION INFILTRATION; PERINEURAL at 13:30

## 2023-07-03 NOTE — PROGRESS NOTES
Ortho Sports Medicine Shoulder New Patient Visit     Assesment:   78 y.o. female left shoulder pain and weakness concerning for acute on chronic rotator cuff tear    Plan:  The patient's diagnosis and treatment were discussed at length today. We discussed no treatment, non-operative treatment, and operative treatment. Brunilda Lyons presents today for initial evaluation left shoulder pain and weakness. I discussed with her that it is likely she has a acute on chronic rotator cuff tear given her recent traumatic fall approximately 2 weeks ago. I discussed with her that I do recommend nonsurgical treatment intervention at this time which includes physical therapy and cortisone injection. I did provide her with a referral to outpatient physical therapy for shoulder, scapular, and rotator cuff strengthening range of motion exercises. I also provided her with a left subacromial bursal injection. I discussed that within the injection there is a lidocaine medicine that we will begin to provide relief for the next 24 to 48 hours before the steroid begins to work over the next several weeks. I discussed with her that she can repeat this injection every 3 months. I discussed with her that if after about 6 to 8 weeks of physical therapy she has no significant improvement of her symptoms, we can consider obtaining further images for evaluation of the rotator cuff pathology. In the meantime she can perform activity as tolerated, however she should avoid repetitive overhead motion as they are likely to exacerbate her symptoms. Continue use ice, heat, and over-the-counter medications for pain relief. She is to follow-up in approximately 2 to 3 months for reevaluation. Large joint arthrocentesis: L subacromial bursa  Universal Protocol:  Consent: Verbal consent obtained.   Risks and benefits: risks, benefits and alternatives were discussed  Consent given by: patient  Time out: Immediately prior to procedure a "time out" was called to verify the correct patient, procedure, equipment, support staff and site/side marked as required. Timeout called at: 7/3/2023 2:03 PM.  Patient understanding: patient states understanding of the procedure being performed  Patient consent: the patient's understanding of the procedure matches consent given  Site marked: the operative site was marked  Patient identity confirmed: verbally with patient    Supporting Documentation  Indications: pain and diagnostic evaluation   Procedure Details  Location: shoulder - L subacromial bursa  Needle size: 22 G  Ultrasound guidance: no  Approach: posterior  Medications administered: 4 mL bupivacaine 0.25 %; 40 mg triamcinolone acetonide 40 mg/mL    Patient tolerance: patient tolerated the procedure well with no immediate complications  Dressing:  Sterile dressing applied            Conservative treatment:    Ice to shoulder 1-2 times daily, for 20 minutes at a time. PT for ROM and strengthening to shoulder, rotator cuff, scapular stabilizers. Home exercise program for shoulder, including ROM and strenthening. Instructions given to patient of what exercises to perform. Let pain guide return to activities. Imaging: All imaging from today was reviewed by myself and explained to the patient. Injection:    The risks and benefits of the injection (which include but are not limited to: infection, bleeding,damage to nerve/artery, need for further intervention), as well as the risks and benefits of all alternative treatments were explained and understood. The patient elected to proceed with injection. The procedure was done with aseptic technique, and the patient tolerated the procedure well with no complications. A corticosteroid injection of the subacromial space was performed. The patient should take 1-2 days off of activity, with gradual return to activity as tolerated.   Ice to the shoulder 1-2 times daily for 20 minutes, for next 24-48 hrs.      Surgery:     No surgery is recommended at this point, continue with conservative treatment plan as noted. Follow up:    Return in about 3 months (around 10/3/2023) for Recheck. Chief Complaint   Patient presents with   • Left Shoulder - Pain       History of Present Illness: The patient is a 78 y.o., right hand dominant female whose occupation is retired, referred to me by themself, seen in clinic for evaluation of left shoulder pain. She states approximately 2 weeks ago she was walking her dog and tripped and tried to brace herself and felt a pulling sensation in her shoulder. She states that she was in significant pain that she was later seen in the emergency department. She states that her pain is down the anterior and lateral aspect of her shoulder, but never radiates past her elbow and now has started to radiate into her neck. She describes the pain as dull and achy that is worse with overhead movement and reaching behind her back. She rates her pain as a 2-10 at rest and increases to a 6 out of 10 with activity and occasionally does wake her up at night. She states that she does need to use the assistance of her right hand to be able to reach overhead. She has not attempted any cortisone injection or outpatient physical therapy in regards to her left shoulder. She is currently managing her pain with ice and over-the-counter medication. She denies any previous history of injury or trauma to her left upper extremity. She denies any numbness or tingling.       Shoulder Surgical History:  None    Past Medical, Social and Family History:  Past Medical History:   Diagnosis Date   • Anemia     slight   • Anxiety    • Depression    • Dysphagia    • Full dentures    • GERD (gastroesophageal reflux disease)    • Hiatal hernia    • HL (hearing loss)    • Kidney stone    • Osteoporosis    • RA (rheumatoid arthritis) ([facility])    • Rheumatoid arthritis ([address])    • Sleep apnea     resolved since gastric sleeve surgery   • Sleep difficulties    • TMJ dysfunction    • Wears glasses      Past Surgical History:   Procedure Laterality Date   • ADENOIDECTOMY     • BARIATRIC SURGERY     • CHOLECYSTECTOMY     • GANGLION CYST EXCISION Left 12/15/2017    Procedure: 2ND INTERSPACE MORTONS NEUROMA EXCISION;  Surgeon: Balbina Sandra DPM;  Location: [facility];  Service: Podiatry   • HYSTERECTOMY     • LITHOTRIPSY     • PARATHYROID GLAND SURGERY      x2   • SLEEVE GASTROPLASTY     • TONSILLECTOMY     • UPPER GASTROINTESTINAL ENDOSCOPY       Allergies   Allergen Reactions   • Amoxicillin Anaphylaxis   • Penicillins Anaphylaxis   • Darvon [Propoxyphene]      Generalized numbness   • Demerol [Meperidine] Other (See Comments)     Generalized numbness   • Nsaids Other (See Comments)     S/p gastric surgery     Current Outpatient Medications on File Prior to Visit   Medication Sig Dispense Refill   • acetaminophen (TYLENOL) 500 mg tablet Take 500 mg by mouth every 6 (six) hours as needed for mild pain     • Ascorbic Acid (vitamin C) 1000 MG tablet Take 1,000 mg by mouth daily     • BIOTIN PO Take 1 tablet by mouth daily     • Calcium Carbonate (CALCIUM 600 PO) Take 1 tablet by mouth daily     • Cholecalciferol (VITAMIN D) 2000 units CAPS Take 1 capsule by mouth daily     • citalopram (CeleXA) 20 mg tablet Take 20 mg by mouth daily. • cyanocobalamin (VITAMIN B-12) 1,000 mcg tablet Take 1,000 mcg by mouth daily     • hydroxychloroquine (PLAQUENIL) 200 mg tablet Take 200 mg by mouth 2 (two) times a day with meals     • meclizine (ANTIVERT) 12.5 MG tablet Take by mouth 3 (three) times a day as needed for dizziness     • Multiple Vitamin (MULTIVITAMIN) capsule Take 1 capsule by mouth daily     • omeprazole (PriLOSEC) 20 mg delayed release capsule Take 20 mg by mouth 2 (two) times a day         No current facility-administered medications on file prior to visit.      Social History     Socioeconomic History   • Marital status:      Spouse name: Not on file   • Number of children: Not on file   • Years of education: Not on file   • Highest education level: Not on file   Occupational History   • Not on file   Tobacco Use   • Smoking status: Former   • Smokeless tobacco: Never   • Tobacco comments:     quit over 30 years   Vaping Use   • Vaping Use: Never used   Substance and Sexual Activity   • Alcohol use: Yes     Comment: socially   • Drug use: No   • Sexual activity: Not on file   Other Topics Concern   • Not on file   Social History Narrative    ** Merged History Encounter **          Social Determinants of Health     Financial Resource Strain: Not on file   Food Insecurity: Not on file   Transportation Needs: Not on file   Physical Activity: Not on file   Stress: Not on file   Social Connections: Not on file   Intimate Partner Violence: Not on file   Housing Stability: Not on file         I have reviewed the past medical, surgical, social and family history, medications and allergies as documented in the EMR. Review of systems: ROS is negative other than that noted in the HPI. Constitutional: Negative for fatigue and fever. HENT: Negative for sore throat. Respiratory: Negative for shortness of breath. Cardiovascular: Negative for chest pain. Gastrointestinal: Negative for abdominal pain. Endocrine: Negative for cold intolerance and heat intolerance. Genitourinary: Negative for flank pain. Musculoskeletal: Negative for back pain. Skin: Negative for rash. Allergic/Immunologic: Negative for immunocompromised state. Neurological: Negative for dizziness. Psychiatric/Behavioral: Negative for agitation. Physical Exam:    Blood pressure 120/69, pulse 76, resp. rate 16, height 5' (1.524 m), weight 70.4 kg (155 lb 1.6 oz).     General/Constitutional: NAD, well developed, well nourished  HENT: Normocephalic, atraumatic  CV: Intact distal pulses, regular rate  Resp: No respiratory distress or labored breathing  Lymphatic: No lymphadenopathy palpated  Neuro: Alert and Oriented x 3, no focal deficits  Psych: Normal mood, normal affect, normal judgement, normal behavior  Skin: Warm, dry, no rashes, no erythema      Shoulder focused exam:     Visual inspection of the shoulder demonstrates normal contour without atrophy  No evidence of scapular dyskinesia or atrophy. No scapular winging  Active and passive range of motion demonstrates forward flexion to  actively to 90 degrees and passively to 140, abduction to 60,  external rotation is 20 with the arm the side, internal rotation to T8   Rotator cuff strength is 4/5 to resisted forward flexion, 4/5 resisted abduction, 4/5 resisted external rotation, 4/5 resisted internal rotation  No tenderness to palpation at the distal clavicle, AC joint, acromion, or scapular spine  No pain with cross-body adduction  + Neer's test, + modified Jovel impingement test  Negative external rotation lag sign  Negative belly press, negative lift-off  + speed's and Yergason's test  Mild tenderness to palpation at the bicipital groove  - [place]'s test        UE NV Exam: +2 Radial pulses bilaterally  Sensation intact to light touch C5-T1 bilaterally, Radial/median/ulnar nerve motor intact      Bilateral elbow, wrist, and and forearm ROM full, painless with passive ROM, no ttp or crepitance throughout extremities below shoulder joint    Cervical ROM is full without pain, numbness or tingling      Shoulder Imaging    X-rays of the left shoulder were reviewed, which demonstrate no acute osseous abnormalities or significant degenerative changes. I have reviewed the radiology report and agree with their impression.       Scribe Attestation    I,:  Andrés Horn am acting as a scribe while in the presence of the attending physician.:       I,:  Cookie Fisher DO personally performed the services described in this documentation    as scribed in my presence.:

## 2023-07-06 ENCOUNTER — EVALUATION (OUTPATIENT)
Dept: PHYSICAL THERAPY | Facility: CLINIC | Age: 78
End: 2023-07-06
Payer: COMMERCIAL

## 2023-07-06 DIAGNOSIS — M67.912 DYSFUNCTION OF LEFT ROTATOR CUFF: Primary | ICD-10-CM

## 2023-07-06 PROCEDURE — 97112 NEUROMUSCULAR REEDUCATION: CPT | Performed by: PHYSICAL THERAPIST

## 2023-07-06 PROCEDURE — 97161 PT EVAL LOW COMPLEX 20 MIN: CPT | Performed by: PHYSICAL THERAPIST

## 2024-03-20 ENCOUNTER — OFFICE VISIT (OUTPATIENT)
Dept: OBGYN CLINIC | Facility: CLINIC | Age: 79
End: 2024-03-20
Payer: COMMERCIAL

## 2024-03-20 VITALS
BODY MASS INDEX: 30.45 KG/M2 | WEIGHT: 155.1 LBS | DIASTOLIC BLOOD PRESSURE: 81 MMHG | RESPIRATION RATE: 16 BRPM | SYSTOLIC BLOOD PRESSURE: 128 MMHG | HEART RATE: 84 BPM | HEIGHT: 60 IN

## 2024-03-20 DIAGNOSIS — M67.912 DYSFUNCTION OF LEFT ROTATOR CUFF: Primary | ICD-10-CM

## 2024-03-20 PROCEDURE — 20610 DRAIN/INJ JOINT/BURSA W/O US: CPT

## 2024-03-20 PROCEDURE — 99213 OFFICE O/P EST LOW 20 MIN: CPT | Performed by: STUDENT IN AN ORGANIZED HEALTH CARE EDUCATION/TRAINING PROGRAM

## 2024-03-20 RX ORDER — TRIAMCINOLONE ACETONIDE 40 MG/ML
40 INJECTION, SUSPENSION INTRA-ARTICULAR; INTRAMUSCULAR
Status: COMPLETED | OUTPATIENT
Start: 2024-03-20 | End: 2024-03-20

## 2024-03-20 RX ORDER — BUPIVACAINE HYDROCHLORIDE 2.5 MG/ML
4 INJECTION, SOLUTION INFILTRATION; PERINEURAL
Status: COMPLETED | OUTPATIENT
Start: 2024-03-20 | End: 2024-03-20

## 2024-03-20 RX ADMIN — TRIAMCINOLONE ACETONIDE 40 MG: 40 INJECTION, SUSPENSION INTRA-ARTICULAR; INTRAMUSCULAR at 11:30

## 2024-03-20 RX ADMIN — BUPIVACAINE HYDROCHLORIDE 4 ML: 2.5 INJECTION, SOLUTION INFILTRATION; PERINEURAL at 11:30

## 2024-03-20 NOTE — PROGRESS NOTES
Ortho Sports Medicine Shoulder Follow Up Visit     Assesment:   78 y.o. female left shoulder pain and weakness concerning for acute on chronic rotator cuff tear    Plan:  The patient's diagnosis and treatment were discussed at length today. We discussed no treatment, non-operative treatment, and operative treatment.    Janelle presents today for initial evaluation left shoulder pain and weakness. At this time she has significant improvement in her pain. At this time I recommended repeat subacromial corticosteroid injection, injection was administered without complications and was well tolerated. Discussed with the patient that she may have soreness for the next couple days and can use ice and tylenol as needed. Patient made aware that she can receive repeat injections every 3 months. Patient is to follow up as needed or in 3 months for repeat injection.        Conservative treatment:    Ice to shoulder 1-2 times daily, for 20 minutes at a time.  PT for ROM and strengthening to shoulder, rotator cuff, scapular stabilizers.  Home exercise program for shoulder, including ROM and strenthening.    Let pain guide return to activities.      Imaging:    No imaging for review today       Injection:    The risks and benefits of the injection (which include but are not limited to: infection, bleeding,damage to nerve/artery, need for further intervention), as well as the risks and benefits of all alternative treatments were explained and understood.  The patient elected to proceed with injection. The procedure was done with aseptic technique, and the patient tolerated the procedure well with no complications.  A corticosteroid injection of the subacromial space was performed.  The patient should take 1-2 days off of activity, with gradual return to activity as tolerated.  Ice to the shoulder 1-2 times daily for 20 minutes, for next 24-48 hrs.      Surgery:     No surgery is recommended at this point, continue with conservative  treatment plan as noted.      Follow up:    No follow-ups on file.        Chief Complaint   Patient presents with    Left Shoulder - Follow-up       History of Present Illness:    The patient is a 78 y.o., at this time patient is present with continued left shoulder pain. She reports that she got 5 months of relief from her last corticosteroid injection. At this time she reports that her pain is better than her pain prior to injection. She is doing home exercises. Denies any numbness or tingling and denies any new injuries.    Shoulder Surgical History:  None    Past Medical, Social and Family History:  Past Medical History:   Diagnosis Date    Anemia     slight    Anxiety     Depression     Dysphagia     Full dentures     GERD (gastroesophageal reflux disease)     Hiatal hernia     HL (hearing loss)     Kidney stone     Osteoporosis     RA (rheumatoid arthritis) ([facility])     Rheumatoid arthritis ([facility])     Sleep apnea     resolved since gastric sleeve surgery    Sleep difficulties     TMJ dysfunction     Wears glasses      Past Surgical History:   Procedure Laterality Date    ADENOIDECTOMY      BARIATRIC SURGERY      CHOLECYSTECTOMY      GANGLION CYST EXCISION Left 12/15/2017    Procedure: 2ND INTERSPACE MORTONS NEUROMA EXCISION;  Surgeon: Lobo Abbott DPM;  Location: [facility];  Service: Podiatry    HYSTERECTOMY      LITHOTRIPSY      PARATHYROID GLAND SURGERY      x2    SLEEVE GASTROPLASTY      TONSILLECTOMY      UPPER GASTROINTESTINAL ENDOSCOPY       Allergies   Allergen Reactions    Amoxicillin Anaphylaxis    Penicillins Anaphylaxis    Darvon [Propoxyphene]      Generalized numbness    Demerol [Meperidine] Other (See Comments)     Generalized numbness    Nsaids Other (See Comments)     S/p gastric surgery     Current Outpatient Medications on File Prior to Visit   Medication Sig Dispense Refill    acetaminophen (TYLENOL) 500 mg tablet Take 500 mg by mouth every 6 (six) hours as needed for mild pain      Ascorbic  Acid (vitamin C) 1000 MG tablet Take 1,000 mg by mouth daily      Calcium Carbonate (CALCIUM 600 PO) Take 1 tablet by mouth daily      Cholecalciferol (VITAMIN D) 2000 units CAPS Take 1 capsule by mouth daily      citalopram (CeleXA) 20 mg tablet Take 20 mg by mouth daily.      cyanocobalamin (VITAMIN B-12) 1,000 mcg tablet Take 1,000 mcg by mouth daily      hydroxychloroquine (PLAQUENIL) 200 mg tablet Take 200 mg by mouth 2 (two) times a day with meals      meclizine (ANTIVERT) 12.5 MG tablet Take by mouth 3 (three) times a day as needed for dizziness      Multiple Vitamin (MULTIVITAMIN) capsule Take 1 capsule by mouth daily      omeprazole (PriLOSEC) 20 mg delayed release capsule Take 20 mg by mouth 2 (two) times a day        BIOTIN PO Take 1 tablet by mouth daily       No current facility-administered medications on file prior to visit.     Social History     Socioeconomic History    Marital status:      Spouse name: Not on file    Number of children: Not on file    Years of education: Not on file    Highest education level: Not on file   Occupational History    Not on file   Tobacco Use    Smoking status: Former    Smokeless tobacco: Never    Tobacco comments:     quit over 30 years   Vaping Use    Vaping status: Never Used   Substance and Sexual Activity    Alcohol use: Yes     Comment: socially    Drug use: No    Sexual activity: Not on file   Other Topics Concern    Not on file   Social History Narrative    ** Merged History Encounter **          Social Determinants of Health     Financial Resource Strain: Not on file   Food Insecurity: Not on file   Transportation Needs: Not on file   Physical Activity: Not on file   Stress: Not on file   Social Connections: Not on file   Intimate Partner Violence: Not on file   Housing Stability: Not on file         I have reviewed the past medical, surgical, social and family history, medications and allergies as documented in the EMR.    Review of systems: PROSPER is  negative other than that noted in the HPI.  Constitutional: Negative for fatigue and fever.   HENT: Negative for sore throat.    Respiratory: Negative for shortness of breath.    Cardiovascular: Negative for chest pain.   Gastrointestinal: Negative for abdominal pain.   Endocrine: Negative for cold intolerance and heat intolerance.   Genitourinary: Negative for flank pain.   Musculoskeletal: Negative for back pain.   Skin: Negative for rash.   Allergic/Immunologic: Negative for immunocompromised state.   Neurological: Negative for dizziness.   Psychiatric/Behavioral: Negative for agitation.      Physical Exam:    Blood pressure 128/81, pulse 84, resp. rate 16, height 5' (1.524 m), weight 70.4 kg (155 lb 1.6 oz).    General/Constitutional: NAD, well developed, well nourished  HENT: Normocephalic, atraumatic  CV: Intact distal pulses, regular rate  Resp: No respiratory distress or labored breathing  Lymphatic: No lymphadenopathy palpated  Neuro: Alert and Oriented x 3, no focal deficits  Psych: Normal mood, normal affect, normal judgement, normal behavior  Skin: Warm, dry, no rashes, no erythema      Shoulder focused exam:     Visual inspection of the shoulder demonstrates normal contour without atrophy  No evidence of scapular dyskinesia or atrophy.  No scapular winging  Active and passive range of motion demonstrates forward flexion to  actively to 100 degrees and passively to 170, abduction to 90,  external rotation is 45 with the arm the side, internal rotation to T8   Rotator cuff strength is 4/5 to resisted forward flexion, 4/5 resisted abduction, 4/5 resisted external rotation, 4/5 resisted internal rotation  No tenderness to palpation at the distal clavicle, AC joint, acromion, or scapular spine  No pain with cross-body adduction  + Neer's test, + modified Jovel impingement test  Negative external rotation lag sign  Negative belly press, negative lift-off  + speed's and Yergason's test  Mild tenderness to  palpation at the bicipital groove  - [place]'s test        UE NV Exam: +2 Radial pulses bilaterally  Sensation intact to light touch C5-T1 bilaterally, Radial/median/ulnar nerve motor intact      Bilateral elbow, wrist, and and forearm ROM full, painless with passive ROM, no ttp or crepitance throughout extremities below shoulder joint    Cervical ROM is full without pain, numbness or tingling      Large joint arthrocentesis: L subacromial bursa  Universal Protocol:  Consent: Verbal consent obtained.  Risks and benefits: risks, benefits and alternatives were discussed  Consent given by: patient  Timeout called at: 3/20/2024 11:36 PM.  Patient understanding: patient states understanding of the procedure being performed  Site marked: the operative site was marked  Patient identity confirmed: verbally with patient  Supporting Documentation  Indications: pain   Procedure Details  Location: shoulder - L subacromial bursa  Needle size: 22 G  Ultrasound guidance: no  Medications administered: 4 mL bupivacaine 0.25 %; 40 mg triamcinolone acetonide 40 mg/mL    Patient tolerance: patient tolerated the procedure well with no immediate complications  Dressing:  Sterile dressing applied             Scribe Attestation      I,:  Debra Ulloa PA-C am acting as a scribe while in the presence of the attending physician.:       I,:  Sumit Maloney DO personally performed the services described in this documentation    as scribed in my presence.:

## 2024-06-14 ENCOUNTER — APPOINTMENT (OUTPATIENT)
Dept: RADIOLOGY | Facility: AMBULARY SURGERY CENTER | Age: 79
End: 2024-06-14
Attending: STUDENT IN AN ORGANIZED HEALTH CARE EDUCATION/TRAINING PROGRAM
Payer: COMMERCIAL

## 2024-06-14 ENCOUNTER — OFFICE VISIT (OUTPATIENT)
Dept: OBGYN CLINIC | Facility: CLINIC | Age: 79
End: 2024-06-14
Payer: COMMERCIAL

## 2024-06-14 VITALS
RESPIRATION RATE: 16 BRPM | WEIGHT: 155.1 LBS | DIASTOLIC BLOOD PRESSURE: 73 MMHG | HEIGHT: 60 IN | HEART RATE: 83 BPM | SYSTOLIC BLOOD PRESSURE: 128 MMHG | BODY MASS INDEX: 30.45 KG/M2

## 2024-06-14 DIAGNOSIS — M67.911 ROTATOR CUFF DYSFUNCTION, RIGHT: Primary | ICD-10-CM

## 2024-06-14 DIAGNOSIS — M25.511 RIGHT SHOULDER PAIN, UNSPECIFIED CHRONICITY: ICD-10-CM

## 2024-06-14 PROCEDURE — 99213 OFFICE O/P EST LOW 20 MIN: CPT | Performed by: STUDENT IN AN ORGANIZED HEALTH CARE EDUCATION/TRAINING PROGRAM

## 2024-06-14 PROCEDURE — 73030 X-RAY EXAM OF SHOULDER: CPT

## 2024-06-14 PROCEDURE — 20610 DRAIN/INJ JOINT/BURSA W/O US: CPT | Performed by: STUDENT IN AN ORGANIZED HEALTH CARE EDUCATION/TRAINING PROGRAM

## 2024-06-14 RX ORDER — TRIAMCINOLONE ACETONIDE 40 MG/ML
40 INJECTION, SUSPENSION INTRA-ARTICULAR; INTRAMUSCULAR
Status: COMPLETED | OUTPATIENT
Start: 2024-06-14 | End: 2024-06-14

## 2024-06-14 RX ORDER — BUPIVACAINE HYDROCHLORIDE 2.5 MG/ML
4 INJECTION, SOLUTION INFILTRATION; PERINEURAL
Status: COMPLETED | OUTPATIENT
Start: 2024-06-14 | End: 2024-06-14

## 2024-06-14 RX ADMIN — BUPIVACAINE HYDROCHLORIDE 4 ML: 2.5 INJECTION, SOLUTION INFILTRATION; PERINEURAL at 12:30

## 2024-06-14 RX ADMIN — TRIAMCINOLONE ACETONIDE 40 MG: 40 INJECTION, SUSPENSION INTRA-ARTICULAR; INTRAMUSCULAR at 12:30

## 2024-06-14 NOTE — PROGRESS NOTES
"Ortho Sports Medicine Shoulder New Patient Visit     Assesment:   78 y.o. female right shoulder rotator cuff dysfunction    Plan:  The patient's diagnosis and treatment were discussed at length today. We discussed no treatment, non-operative treatment, and operative treatment.    Janelle presents today for initial evaluation right shoulder rotator cuff dysfunction.  Given that she has had significant improvement of her symptoms from the prior left subacromial bursal injections, we collectively agreed to attempt 1 on her right.  Right subacromial bursa injection was performed and she tolerated procedure well.  All question concerns were answered.  I discussed with her she can continue with her home exercise program as tolerated.  She can continue to form activity as tolerated using pain as a guide.  She can continue to use ice and over-the-counter medication as needed for pain relief.  She can follow-up in approximately 3 months for repeat cortisone injection or as needed.    Large joint arthrocentesis: R subacromial bursa  Universal Protocol:  Consent: Verbal consent obtained.  Risks and benefits: risks, benefits and alternatives were discussed  Consent given by: patient  Time out: Immediately prior to procedure a \"time out\" was called to verify the correct patient, procedure, equipment, support staff and site/side marked as required.  Timeout called at: 6/14/2024 12:55 PM.  Patient understanding: patient states understanding of the procedure being performed  Patient consent: the patient's understanding of the procedure matches consent given  Site marked: the operative site was marked  Patient identity confirmed: verbally with patient  Supporting Documentation  Indications: pain and diagnostic evaluation   Procedure Details  Location: shoulder - R subacromial bursa  Preparation: Patient was prepped and draped in the usual sterile fashion  Needle size: 22 G  Ultrasound guidance: no  Approach: posterior  Medications " administered: 4 mL bupivacaine 0.25 %; 40 mg triamcinolone acetonide 40 mg/mL    Patient tolerance: patient tolerated the procedure well with no immediate complications  Dressing:  Sterile dressing applied            Conservative treatment:    Ice to shoulder 1-2 times daily, for 20 minutes at a time.  Home exercise program for shoulder, including ROM and strenthening.  Instructions given to patient of what exercises to perform.  Let pain guide return to activities.      Imaging:    All imaging from today was reviewed by myself and explained to the patient.       Injection:    The risks and benefits of the injection (which include but are not limited to: infection, bleeding,damage to nerve/artery, need for further intervention), as well as the risks and benefits of all alternative treatments were explained and understood.  The patient elected to proceed with injection. The procedure was done with aseptic technique, and the patient tolerated the procedure well with no complications.  A corticosteroid injection of the subacromial space was performed.  The patient should take 1-2 days off of activity, with gradual return to activity as tolerated.  Ice to the shoulder 1-2 times daily for 20 minutes, for next 24-48 hrs.      Surgery:     No surgery is recommended at this point, continue with conservative treatment plan as noted.      Follow up:    Return in about 3 months (around 9/14/2024) for repeat CSI or as needed.        Chief Complaint   Patient presents with    Right Shoulder - Pain       History of Present Illness:    The patient is a 78 y.o., right hand dominant female whose occupation is retired, referred to me by themself, seen in clinic for evaluation of right shoulder pain.  She states that she has been having ongoing right shoulder pain now for several months without any specific injury or trauma.  She states that the pain is dull and achy along the anterior lateral aspect of her shoulder that does  occasionally radiate up into her neck.  She states that repetitive overhead lifting as well as reaching behind her back does cause her increased pain and discomfort.  She states that her pain feels very similar to that of what was on her left.  She has not attempted any right upper extremity cortisone injections, however she has been doing exercises for her left shoulder on her right.  She denies any previous history of injury or trauma to her shoulder.  She denies any numbness or tingling.      Shoulder Surgical History:  None    Past Medical, Social and Family History:  Past Medical History:   Diagnosis Date    Anemia     slight    Anxiety     Depression     Dysphagia     Full dentures     GERD (gastroesophageal reflux disease)     Hiatal hernia     HL (hearing loss)     Kidney stone     Osteoporosis     RA (rheumatoid arthritis) (HCC)     Rheumatoid arthritis (HCC)     Sleep apnea     resolved since gastric sleeve surgery    Sleep difficulties     TMJ dysfunction     Wears glasses      Past Surgical History:   Procedure Laterality Date    ADENOIDECTOMY      BARIATRIC SURGERY      CHOLECYSTECTOMY      GANGLION CYST EXCISION Left 12/15/2017    Procedure: 2ND INTERSPACE MORTONS NEUROMA EXCISION;  Surgeon: Lobo Abbott DPM;  Location: [facility];  Service: Podiatry    HYSTERECTOMY      LITHOTRIPSY      PARATHYROID GLAND SURGERY      x2    SLEEVE GASTROPLASTY      TONSILLECTOMY      UPPER GASTROINTESTINAL ENDOSCOPY       Allergies   Allergen Reactions    Amoxicillin Anaphylaxis    Penicillins Anaphylaxis    Darvon [Propoxyphene]      Generalized numbness    Demerol [Meperidine] Other (See Comments)     Generalized numbness    Nsaids Other (See Comments)     S/p gastric surgery     Current Outpatient Medications on File Prior to Visit   Medication Sig Dispense Refill    acetaminophen (TYLENOL) 500 mg tablet Take 500 mg by mouth every 6 (six) hours as needed for mild pain      Ascorbic Acid (vitamin C) 1000 MG tablet  Take 1,000 mg by mouth daily      Calcium Carbonate (CALCIUM 600 PO) Take 1 tablet by mouth daily      Cholecalciferol (VITAMIN D) 2000 units CAPS Take 1 capsule by mouth daily      citalopram (CeleXA) 20 mg tablet Take 20 mg by mouth daily.      cyanocobalamin (VITAMIN B-12) 1,000 mcg tablet Take 1,000 mcg by mouth daily      hydroxychloroquine (PLAQUENIL) 200 mg tablet Take 200 mg by mouth 2 (two) times a day with meals      meclizine (ANTIVERT) 12.5 MG tablet Take by mouth 3 (three) times a day as needed for dizziness      Multiple Vitamin (MULTIVITAMIN) capsule Take 1 capsule by mouth daily      omeprazole (PriLOSEC) 20 mg delayed release capsule Take 20 mg by mouth 2 (two) times a day        BIOTIN PO Take 1 tablet by mouth daily       No current facility-administered medications on file prior to visit.     Social History     Socioeconomic History    Marital status:      Spouse name: Not on file    Number of children: Not on file    Years of education: Not on file    Highest education level: Not on file   Occupational History    Not on file   Tobacco Use    Smoking status: Former    Smokeless tobacco: Never    Tobacco comments:     quit over 30 years   Vaping Use    Vaping status: Never Used   Substance and Sexual Activity    Alcohol use: Yes     Comment: socially    Drug use: No    Sexual activity: Not on file   Other Topics Concern    Not on file   Social History Narrative    ** Merged History Encounter **          Social Determinants of Health     Financial Resource Strain: Not on file   Food Insecurity: Not on file   Transportation Needs: Not on file   Physical Activity: Not on file   Stress: Not on file   Social Connections: Not on file   Intimate Partner Violence: Not on file   Housing Stability: Not on file         I have reviewed the past medical, surgical, social and family history, medications and allergies as documented in the EMR.    Review of systems: ROS is negative other than that noted in  the HPI.  Constitutional: Negative for fatigue and fever.   HENT: Negative for sore throat.    Respiratory: Negative for shortness of breath.    Cardiovascular: Negative for chest pain.   Gastrointestinal: Negative for abdominal pain.   Endocrine: Negative for cold intolerance and heat intolerance.   Genitourinary: Negative for flank pain.   Musculoskeletal: Negative for back pain.   Skin: Negative for rash.   Allergic/Immunologic: Negative for immunocompromised state.   Neurological: Negative for dizziness.   Psychiatric/Behavioral: Negative for agitation.      Physical Exam:    Blood pressure 128/73, pulse 83, resp. rate 16, height 5' (1.524 m), weight 70.4 kg (155 lb 1.6 oz).    General/Constitutional: NAD, well developed, well nourished  HENT: Normocephalic, atraumatic  CV: Intact distal pulses, regular rate  Resp: No respiratory distress or labored breathing  Lymphatic: No lymphadenopathy palpated  Neuro: Alert and Oriented x 3, no focal deficits  Psych: Normal mood, normal affect, normal judgement, normal behavior  Skin: Warm, dry, no rashes, no erythema      Shoulder focused exam:     Visual inspection of the shoulder demonstrates normal contour without atrophy  No evidence of scapular dyskinesia or atrophy.  No scapular winging  Active and passive range of motion demonstrates forward flexion to 160, abduction to 100,  external rotation is 60 with the arm the side, internal rotation to L1   Rotator cuff strength is 4+/5 to resisted forward flexion, 4+/5 resisted abduction, 4+/5 resisted external rotation, 4+/5 resisted internal rotation  No tenderness to palpation at the distal clavicle, AC joint, acromion, or scapular spine  No pain with cross-body adduction  - Neer's test, - modified Jovel impingement test  Negative external rotation lag sign  Negative belly press, negative lift-off  - speed's and Yergason's test  + tenderness to palpation at the bicipital groove  - [place]'s test        UE NV Exam: +2  Radial pulses bilaterally  Sensation intact to light touch C5-T1 bilaterally, Radial/median/ulnar nerve motor intact      Bilateral elbow, wrist, and and forearm ROM full, painless with passive ROM, no ttp or crepitance throughout extremities below shoulder joint    Cervical ROM is full without pain, numbness or tingling      Shoulder Imaging    X-rays of the right shoulder were reviewed, which demonstrate no acute osseous abnormalities with mild degenerative changes.  I have reviewed the radiology report and do not currently have a radiology reading from [facility], but will check the result once the reading is performed.      Scribe Attestation      I,:  Estiven Sanford am acting as a scribe while in the presence of the attending physician.:       I,:  Sumit Maloney, DO personally performed the services described in this documentation    as scribed in my presence.:

## 2024-09-04 ENCOUNTER — OFFICE VISIT (OUTPATIENT)
Dept: OBGYN CLINIC | Facility: CLINIC | Age: 79
End: 2024-09-04
Payer: COMMERCIAL

## 2024-09-04 VITALS
HEART RATE: 69 BPM | BODY MASS INDEX: 30.43 KG/M2 | WEIGHT: 155 LBS | SYSTOLIC BLOOD PRESSURE: 100 MMHG | HEIGHT: 60 IN | DIASTOLIC BLOOD PRESSURE: 64 MMHG

## 2024-09-04 DIAGNOSIS — M25.531 PAIN IN RIGHT WRIST: ICD-10-CM

## 2024-09-04 DIAGNOSIS — M67.912 DYSFUNCTION OF LEFT ROTATOR CUFF: Primary | ICD-10-CM

## 2024-09-04 PROCEDURE — 99213 OFFICE O/P EST LOW 20 MIN: CPT | Performed by: STUDENT IN AN ORGANIZED HEALTH CARE EDUCATION/TRAINING PROGRAM

## 2024-09-04 PROCEDURE — 20610 DRAIN/INJ JOINT/BURSA W/O US: CPT | Performed by: STUDENT IN AN ORGANIZED HEALTH CARE EDUCATION/TRAINING PROGRAM

## 2024-09-04 RX ORDER — BUPIVACAINE HYDROCHLORIDE 2.5 MG/ML
4 INJECTION, SOLUTION INFILTRATION; PERINEURAL
Status: COMPLETED | OUTPATIENT
Start: 2024-09-04 | End: 2024-09-04

## 2024-09-04 RX ORDER — TRIAMCINOLONE ACETONIDE 40 MG/ML
40 INJECTION, SUSPENSION INTRA-ARTICULAR; INTRAMUSCULAR
Status: COMPLETED | OUTPATIENT
Start: 2024-09-04 | End: 2024-09-04

## 2024-09-04 RX ADMIN — TRIAMCINOLONE ACETONIDE 40 MG: 40 INJECTION, SUSPENSION INTRA-ARTICULAR; INTRAMUSCULAR at 14:15

## 2024-09-04 RX ADMIN — BUPIVACAINE HYDROCHLORIDE 4 ML: 2.5 INJECTION, SOLUTION INFILTRATION; PERINEURAL at 14:15

## 2024-09-04 NOTE — PROGRESS NOTES
"Ortho Sports Medicine Shoulder Follow Up Visit     Assesment:   78 y.o. adult left shoulder rotator cuff dysfunction    Plan:  The patient's diagnosis and treatment were discussed at length today. We discussed no treatment, non-operative treatment, and operative treatment.    Janelle presents today for follow-up evaluation left shoulder rotator cuff dysfunction.  Given that she has an improvement of her symptoms from the prior cortisone injection on 3/20/2024.  We agreed to repeat the cortisone injection at today's visit.  A left subacromial bursal injection was performed.  She tolerated the procedure well and all question concerns were answered.  She can continue with her home exercises as tolerated.  She can continue ice and over-the-counter medication as needed for pain relief.  She can follow-up with me in approximately 3 months for repeat cortisone injection of her left shoulder or sooner for repeat injection of her right shoulder.    During today's visit she also reports that she has been having ongoing right wrist pain that radiates up to her elbow, but does not radiate into her hand.  She is currently wearing a wrist splint at night and occasionally during the day.  Given this I did provide her with a referral to a hand specialist for further treatment and evaluation.    Large joint arthrocentesis: L subacromial bursa  Universal Protocol:  Consent: Verbal consent obtained.  Risks and benefits: risks, benefits and alternatives were discussed  Consent given by: patient  Time out: Immediately prior to procedure a \"time out\" was called to verify the correct patient, procedure, equipment, support staff and site/side marked as required.  Timeout called at: 9/4/2024 2:32 PM.  Patient understanding: patient states understanding of the procedure being performed  Patient consent: the patient's understanding of the procedure matches consent given  Site marked: the operative site was marked  Patient identity confirmed: " verbally with patient  Supporting Documentation  Indications: pain and diagnostic evaluation   Procedure Details  Location: shoulder - L subacromial bursa  Preparation: Patient was prepped and draped in the usual sterile fashion  Needle size: 22 G  Ultrasound guidance: no  Approach: posterior  Medications administered: 4 mL bupivacaine 0.25 %; 40 mg triamcinolone acetonide 40 mg/mL    Patient tolerance: patient tolerated the procedure well with no immediate complications  Dressing:  Sterile dressing applied            Conservative treatment:    Ice to shoulder 1-2 times daily, for 20 minutes at a time.  Home exercise program for shoulder, including ROM and strenthening.  Instructions given to patient of what exercises to perform.  Let pain guide return to activities.      Imaging:    All imaging from today was reviewed by myself and explained to the patient.       Injection:    The risks and benefits of the injection (which include but are not limited to: infection, bleeding,damage to nerve/artery, need for further intervention), as well as the risks and benefits of all alternative treatments were explained and understood.  The patient elected to proceed with injection. The procedure was done with aseptic technique, and the patient tolerated the procedure well with no complications.  A corticosteroid injection of the subacromial space was performed.  The patient should take 1-2 days off of activity, with gradual return to activity as tolerated.  Ice to the shoulder 1-2 times daily for 20 minutes, for next 24-48 hrs.      Surgery:     No surgery is recommended at this point, continue with conservative treatment plan as noted.      Follow up:    No follow-ups on file.      Chief Complaint   Patient presents with    Left Shoulder - Pain     CSI     Injections         History of Present Illness:    The patient is returns for follow up of left shoulder rotator cuff dysfunction.  She states that the previous cortisone  injection that she received on 3/20/2024 did provide her significant improvement of her symptoms until approximately 1 to 2 weeks ago.  She continues to be compliant with a home exercise program.  She states that her right shoulder which she received a cortisone injection on 6/14/2024 is providing improvement of her symptoms.  She would like to repeat left cortisone injection during today's visit.  She denies any new injury or trauma to her shoulder.  She denies any numbness or tingling.         Shoulder Surgical History:  None    Past Medical, Social and Family History:  Past Medical History:   Diagnosis Date    Anemia     slight    Anxiety     Depression     Dysphagia     Full dentures     GERD (gastroesophageal reflux disease)     Hiatal hernia     HL (hearing loss)     Kidney stone     Osteoporosis     RA (rheumatoid arthritis) (HCC)     Rheumatoid arthritis (HCC)     Sleep apnea     resolved since gastric sleeve surgery    Sleep difficulties     TMJ dysfunction     Wears glasses      Past Surgical History:   Procedure Laterality Date    ADENOIDECTOMY      BARIATRIC SURGERY      CHOLECYSTECTOMY      GANGLION CYST EXCISION Left 12/15/2017    Procedure: 2ND INTERSPACE MORTONS NEUROMA EXCISION;  Surgeon: Lobo Abbott DPM;  Location: [facility];  Service: Podiatry    HYSTERECTOMY      LITHOTRIPSY      PARATHYROID GLAND SURGERY      x2    SLEEVE GASTROPLASTY      TONSILLECTOMY      UPPER GASTROINTESTINAL ENDOSCOPY       Allergies   Allergen Reactions    Amoxicillin Anaphylaxis    Penicillins Anaphylaxis    Darvon [Propoxyphene]      Generalized numbness    Demerol [Meperidine] Other (See Comments)     Generalized numbness    Nsaids Other (See Comments)     S/p gastric surgery     Current Outpatient Medications on File Prior to Visit   Medication Sig Dispense Refill    acetaminophen (TYLENOL) 500 mg tablet Take 500 mg by mouth every 6 (six) hours as needed for mild pain      Ascorbic Acid (vitamin C) 1000 MG tablet  Take 1,000 mg by mouth daily      Calcium Carbonate (CALCIUM 600 PO) Take 1 tablet by mouth daily      Cholecalciferol (VITAMIN D) 2000 units CAPS Take 1 capsule by mouth daily      citalopram (CeleXA) 20 mg tablet Take 20 mg by mouth daily.      cyanocobalamin (VITAMIN B-12) 1,000 mcg tablet Take 1,000 mcg by mouth daily      hydroxychloroquine (PLAQUENIL) 200 mg tablet Take 200 mg by mouth 2 (two) times a day with meals      meclizine (ANTIVERT) 12.5 MG tablet Take by mouth 3 (three) times a day as needed for dizziness      Multiple Vitamin (MULTIVITAMIN) capsule Take 1 capsule by mouth daily      omeprazole (PriLOSEC) 20 mg delayed release capsule Take 20 mg by mouth 2 (two) times a day        [DISCONTINUED] BIOTIN PO Take 1 tablet by mouth daily       No current facility-administered medications on file prior to visit.     Social History     Socioeconomic History    Marital status:      Spouse name: Not on file    Number of children: Not on file    Years of education: Not on file    Highest education level: Not on file   Occupational History    Not on file   Tobacco Use    Smoking status: Former    Smokeless tobacco: Never    Tobacco comments:     quit over 30 years   Vaping Use    Vaping status: Never Used   Substance and Sexual Activity    Alcohol use: Yes     Comment: socially    Drug use: No    Sexual activity: Not on file   Other Topics Concern    Not on file   Social History Narrative    ** Merged History Encounter **          Social Determinants of Health     Financial Resource Strain: Not on file   Food Insecurity: Not on file   Transportation Needs: Not on file   Physical Activity: Not on file   Stress: Not on file   Social Connections: Not on file   Intimate Partner Violence: Not on file   Housing Stability: Not on file       I have reviewed the past medical, surgical, social and family history, medications and allergies as documented in the EMR.    Review of systems: ROS is negative other than  that noted in the HPI.  Constitutional: Negative for fatigue and fever.      Physical Exam:    Blood pressure 100/64, pulse 69, height 5' (1.524 m), weight 70.3 kg (155 lb).    General/Constitutional: NAD, well developed, well nourished  HENT: Normocephalic, atraumatic  CV: Intact distal pulses, regular rate  Resp: No respiratory distress or labored breathing  Lymphatic: No lymphadenopathy palpated  Neuro: Alert and Oriented x 3, no focal deficits  Psych: Normal mood, normal affect, normal judgement, normal behavior  Skin: Warm, dry, no rashes, no erythema      Shoulder focused exam:     Visual inspection of the shoulder demonstrates normal contour without atrophy  No evidence of scapular dyskinesia or atrophy.  No scapular winging  Active and passive range of motion demonstrates forward flexion to  actively to 100 degrees and passively to 170, abduction to 90,  external rotation is 45 with the arm the side, internal rotation to T8   Rotator cuff strength is 4/5 to resisted forward flexion, 4/5 resisted abduction, 4/5 resisted external rotation, 4/5 resisted internal rotation  No tenderness to palpation at the distal clavicle, AC joint, acromion, or scapular spine  No pain with cross-body adduction  + Neer's test, + modified Jovel impingement test  Negative external rotation lag sign  Negative belly press, negative lift-off  + speed's and Yergason's test  Mild tenderness to palpation at the bicipital groove  - [place]'s test    UE NV Exam: +2 Radial pulses bilaterally  Sensation intact to light touch C5-T1 bilaterally, Radial/median/ulnar nerve motor intact    Cervical ROM is full without pain, numbness or tingling      Shoulder Imaging    No imaging was performed today      Scribe Attestation      I,:  Estiven Sanford am acting as a scribe while in the presence of the attending physician.:       I,:  Sumit Maloney, DO personally performed the services described in this documentation    as scribed in my  presence.:

## 2024-09-19 ENCOUNTER — APPOINTMENT (OUTPATIENT)
Dept: RADIOLOGY | Facility: AMBULARY SURGERY CENTER | Age: 79
End: 2024-09-19
Attending: SURGERY
Payer: COMMERCIAL

## 2024-09-19 ENCOUNTER — OFFICE VISIT (OUTPATIENT)
Dept: OBGYN CLINIC | Facility: CLINIC | Age: 79
End: 2024-09-19
Payer: COMMERCIAL

## 2024-09-19 VITALS
BODY MASS INDEX: 29.45 KG/M2 | HEIGHT: 60 IN | DIASTOLIC BLOOD PRESSURE: 78 MMHG | WEIGHT: 150 LBS | HEART RATE: 69 BPM | SYSTOLIC BLOOD PRESSURE: 131 MMHG

## 2024-09-19 DIAGNOSIS — M79.641 RIGHT HAND PAIN: ICD-10-CM

## 2024-09-19 DIAGNOSIS — M25.531 PAIN IN RIGHT WRIST: ICD-10-CM

## 2024-09-19 DIAGNOSIS — M25.531 PAIN IN RIGHT WRIST: Primary | ICD-10-CM

## 2024-09-19 PROCEDURE — 73130 X-RAY EXAM OF HAND: CPT

## 2024-09-19 PROCEDURE — 99203 OFFICE O/P NEW LOW 30 MIN: CPT | Performed by: SURGERY

## 2024-09-19 NOTE — PROGRESS NOTES
Emmy Delgado M.D.  Attending, Orthopaedic Surgery  Hand, Wrist, and Elbow Surgery  [facility]      ORTHOPAEDIC HAND, WRIST, AND ELBOW OFFICE  VISIT       ASSESSMENT/PLAN:      78 y.o. female with right hand/wrist arthritis, generalized pain      X-rays were performed in the office and reviewed. The etiology of osteoarthritis was discussed along with treatment options. We discussed warm moist heat can be beneficial for arthritic pain. She may continue the use of Voltaren Gel and Tylenol on an as needed basis. We discussed Voltaren gel works well on the hands and wrists as the bones are closer to the skin surface. We discussed localized CSI's in the future if symptoms worsen or fail to improve and is she is able to pinpoint a specific joint that is bothersome for her. I will see her back in the office on an as needed basis.     The patient verbalized understanding of exam findings and treatment plan. We engaged in the shared decision-making process and treatment options were discussed at length with the patient. Surgical and conservative management discussed today along with risks and benefits.    Diagnoses and all orders for this visit:    Pain in right wrist  -     XR wrist 3+ vw right; Future  -     Ambulatory Referral to Orthopedic Surgery  -     XR hand 3+ vw right; Future    Right hand pain  -     XR hand 3+ vw right; Future      Follow Up:  Return if symptoms worsen or fail to improve.    To Do Next Visit:  Re-evaluation of current issue      General Discussions:  Osteoarthritis:  The anatomy and physiology of osteoarthritis was discussed with the patient today in the office.  Deterioration of the articular cartilage eventually leads to altered mobility at the joint, resulting in joint subluxation, osteophyte formation, cystic changes, as well as subchondral sclerosis.  Eventually, pain, limited mobility, and compensatory hypermobility at surrounding joints may develop.  While normal  activity and usage of the joint may provide a painful experience to the patient, this typically does not result in damage to the limb.  Treatment options include splints to decreased joint edema, pain, and inflammation.  Therapy exercises to strengthen the surrounding musculature may relieve pain, but do not alter the overall continued development of osteoarthritis.  Oral medications, topical medications, corticosteroid injections may decrease pain and increase overall function.  Eventually, some patients may require surgical intervention.     ____________________________________________________________________________________________________________________________________________      CHIEF COMPLAINT:  Chief Complaint   Patient presents with    Right Wrist - Pain     Gets a pain in the wrist. She can't  a coffee pot. Goes to the fingers and up to the elbow.        SUBJECTIVE:  Janelleozzy Maloney is a 78 y.o. year old RHD adult who presents to the office for right wrist pain. I am seeing Janelle in consultation at the request of Dr. Smuit Maloney. She notes diffuse hand and wrist pain. She notes in the AM she feels her hand is stiff and she has difficulty making a fist. She feels this is due to her underlying rheumatoid arthritis. She is on Plaquenil that is managed by her PCP. She will take Tylenol for pain control. She also uses Voltaren Gel which is beneficial for her. She denies associated numbness and tingling.      Pain/symptom timing:  Worse during the day when active  Pain/symptom context:  Worse with activites and work  Pain/symptom modifying factors:  Rest makes better, activities make worse  Pain/symptom associated signs/symptoms: none    Prior treatment   NSAIDsNo   Injections No   Bracing/Orthotics No    Physical Therapy No     I have personally reviewed all the relevant PMH, PSH, SH, FH, Medications and allergies      PAST MEDICAL HISTORY:  Past Medical History:   Diagnosis Date    Anemia     slight     Anxiety     Depression     Dysphagia     Full dentures     GERD (gastroesophageal reflux disease)     Hiatal hernia     HL (hearing loss)     Kidney stone     Osteoporosis     RA (rheumatoid arthritis) (HCC)     Rheumatoid arthritis (HCC)     Sleep apnea     resolved since gastric sleeve surgery    Sleep difficulties     TMJ dysfunction     Wears glasses        PAST SURGICAL HISTORY:  Past Surgical History:   Procedure Laterality Date    ADENOIDECTOMY      BARIATRIC SURGERY      CHOLECYSTECTOMY      GANGLION CYST EXCISION Left 12/15/2017    Procedure: 2ND INTERSPACE MORTONS NEUROMA EXCISION;  Surgeon: Lobo Abbott DPM;  Location: [facility];  Service: Podiatry    HYSTERECTOMY      LITHOTRIPSY      PARATHYROID GLAND SURGERY      x2    SLEEVE GASTROPLASTY      TONSILLECTOMY      UPPER GASTROINTESTINAL ENDOSCOPY         FAMILY HISTORY:  Family History   Problem Relation Age of Onset    Hyperparathyroidism Father     Hyperparathyroidism Sister     Hyperparathyroidism Family        SOCIAL HISTORY:  Social History     Tobacco Use    Smoking status: Former    Smokeless tobacco: Never    Tobacco comments:     quit over 30 years   Vaping Use    Vaping status: Never Used   Substance Use Topics    Alcohol use: Yes     Comment: socially    Drug use: No       MEDICATIONS:    Current Outpatient Medications:     acetaminophen (TYLENOL) 500 mg tablet, Take 500 mg by mouth every 6 (six) hours as needed for mild pain, Disp: , Rfl:     Ascorbic Acid (vitamin C) 1000 MG tablet, Take 1,000 mg by mouth daily, Disp: , Rfl:     Calcium Carbonate (CALCIUM 600 PO), Take 1 tablet by mouth daily, Disp: , Rfl:     Cholecalciferol (VITAMIN D) 2000 units CAPS, Take 1 capsule by mouth daily, Disp: , Rfl:     citalopram (CeleXA) 20 mg tablet, Take 20 mg by mouth daily., Disp: , Rfl:     cyanocobalamin (VITAMIN B-12) 1,000 mcg tablet, Take 1,000 mcg by mouth daily, Disp: , Rfl:     hydroxychloroquine (PLAQUENIL) 200 mg tablet, Take 200 mg by  mouth 2 (two) times a day with meals, Disp: , Rfl:     meclizine (ANTIVERT) 12.5 MG tablet, Take by mouth 3 (three) times a day as needed for dizziness, Disp: , Rfl:     omeprazole (PriLOSEC) 20 mg delayed release capsule, Take 20 mg by mouth 2 (two) times a day  , Disp: , Rfl:     Multiple Vitamin (MULTIVITAMIN) capsule, Take 1 capsule by mouth daily, Disp: , Rfl:     ALLERGIES:  Allergies   Allergen Reactions    Amoxicillin Anaphylaxis    Penicillins Anaphylaxis    Darvon [Propoxyphene]      Generalized numbness    Demerol [Meperidine] Other (See Comments)     Generalized numbness    Nsaids Other (See Comments)     S/p gastric surgery           REVIEW OF SYSTEMS:  Review of Systems   Constitutional:  Negative for chills, fever and unexpected weight change.   HENT:  Negative for hearing loss, nosebleeds and sore throat.    Eyes:  Negative for pain, redness and visual disturbance.   Respiratory:  Negative for cough, shortness of breath and wheezing.    Cardiovascular:  Negative for chest pain, palpitations and leg swelling.   Gastrointestinal:  Negative for abdominal pain, nausea and vomiting.   Endocrine: Negative for polydipsia and polyuria.   Genitourinary:  Negative for difficulty urinating and hematuria.   Musculoskeletal:  Positive for arthralgias. Negative for joint swelling and myalgias.   Skin:  Negative for rash and wound.   Neurological:  Negative for dizziness, numbness and headaches.   Psychiatric/Behavioral:  Negative for decreased concentration, dysphoric mood and suicidal ideas. The patient is not nervous/anxious.        VITALS:  Vitals:    09/19/24 1432   BP: 131/78   Pulse: 69       LABS:      _____________________________________________________  PHYSICAL EXAMINATION:  General: well developed and well nourished, alert, oriented times 3, and appears comfortable  Psychiatric: Normal  HEENT: Normocephalic, Atraumatic Trachea Midline, No torticollis  Pulmonary: No audible wheezing or respiratory  "distress   Abdomen/GI: Non tender, non distended   Cardiovascular: No pitting edema, 2+ radial pulse   Skin: No masses, erythema, lacerations, fluctation, ulcerations  Neurovascular: Sensation Intact to the Median, Ulnar, Radial Nerve, Motor Intact to the Median, Ulnar, Radial Nerve, and Pulses Intact  Musculoskeletal: Normal, except as noted in detailed exam and in HPI.      MUSCULOSKELETAL EXAMINATION:    Right hand/wrist:     No erythema, ecchymosis or edema  Central radiocarpal joint tenderness  No other bony or soft tissue tenderness noted  Normal wrist ROM   Full digital extension   Full composite fist   2+ radial pulse     ___________________________________________________  STUDIES REVIEWED:  I have personally reviewed and interpreted  AP lateral and oblique radiographs of right hand    which demonstrate degenerative changes of the distal ulna, with some chondrocalcinosis of the TFCC mild scattered degenerative changes      LABS REVIEWED:    HgA1c: No results found for: \"HGBA1C\"  BMP:   Lab Results   Component Value Date    GLUCOSE 90 07/14/2016    CALCIUM 9.9 02/21/2023     03/17/2015    K 3.8 02/21/2023    CO2 27 02/21/2023     02/21/2023    BUN 12 02/21/2023    CREATININE 1.01 02/21/2023               PROCEDURES PERFORMED:  Procedures  No Procedures performed today    _____________________________________________________      Scribe Attestation      I,:  Melanie Matos MA am acting as a scribe while in the presence of the attending physician.:       I,:  Emmy Delgado MD personally performed the services described in this documentation    as scribed in my presence.:                   "

## 2024-12-18 ENCOUNTER — OFFICE VISIT (OUTPATIENT)
Dept: OBGYN CLINIC | Facility: CLINIC | Age: 79
End: 2024-12-18
Payer: COMMERCIAL

## 2024-12-18 DIAGNOSIS — M67.912 DYSFUNCTION OF LEFT ROTATOR CUFF: Primary | ICD-10-CM

## 2024-12-18 DIAGNOSIS — M67.911 ROTATOR CUFF DYSFUNCTION, RIGHT: ICD-10-CM

## 2024-12-18 PROCEDURE — 20610 DRAIN/INJ JOINT/BURSA W/O US: CPT

## 2024-12-18 PROCEDURE — 99213 OFFICE O/P EST LOW 20 MIN: CPT | Performed by: STUDENT IN AN ORGANIZED HEALTH CARE EDUCATION/TRAINING PROGRAM

## 2024-12-18 PROCEDURE — 20610 DRAIN/INJ JOINT/BURSA W/O US: CPT | Performed by: STUDENT IN AN ORGANIZED HEALTH CARE EDUCATION/TRAINING PROGRAM

## 2024-12-18 RX ORDER — TRIAMCINOLONE ACETONIDE 40 MG/ML
40 INJECTION, SUSPENSION INTRA-ARTICULAR; INTRAMUSCULAR
Status: COMPLETED | OUTPATIENT
Start: 2024-12-18 | End: 2024-12-18

## 2024-12-18 RX ORDER — BUPIVACAINE HYDROCHLORIDE 2.5 MG/ML
4 INJECTION, SOLUTION INFILTRATION; PERINEURAL
Status: COMPLETED | OUTPATIENT
Start: 2024-12-18 | End: 2024-12-18

## 2024-12-18 RX ADMIN — TRIAMCINOLONE ACETONIDE 40 MG: 40 INJECTION, SUSPENSION INTRA-ARTICULAR; INTRAMUSCULAR at 14:15

## 2024-12-18 RX ADMIN — BUPIVACAINE HYDROCHLORIDE 4 ML: 2.5 INJECTION, SOLUTION INFILTRATION; PERINEURAL at 14:15

## 2024-12-18 NOTE — PROGRESS NOTES
Ortho Sports Medicine Shoulder Follow Up Visit     Assesment:   79 y.o. female left shoulder rotator cuff dysfunction    Plan:  The patient's diagnosis and treatment were discussed at length today. We discussed no treatment, non-operative treatment, and operative treatment.    Janelle presents today for follow-up evaluation left shoulder rotator cuff dysfunction.  Given that she has an improvement of her symptoms from the prior cortisone injection.  Due to previous success in pain relief recommended repeat bilateral subacromial coricosteroid injections which were given without complication and was well tolerated. Discussed that she can use ice and OTC pain medication as needed. Patient is to follow up as needed.     Large joint arthrocentesis: bilateral subacromial bursa  Universal Protocol:  Consent: Verbal consent obtained.  Risks and benefits: risks, benefits and alternatives were discussed  Consent given by: patient  Timeout called at: 12/18/2024 2:49 PM.  Patient understanding: patient states understanding of the procedure being performed  Site marked: the operative site was marked  Patient identity confirmed: verbally with patient  Supporting Documentation  Indications: pain   Procedure Details  Location: shoulder - bilateral subacromial bursa  Needle size: 22 G  Ultrasound guidance: no    Medications (Right): 4 mL bupivacaine 0.25 %; 40 mg triamcinolone acetonide 40 mg/mLMedications (Left): 4 mL bupivacaine 0.25 %; 40 mg triamcinolone acetonide 40 mg/mL   Patient tolerance: patient tolerated the procedure well with no immediate complications  Dressing:  Sterile dressing applied            Conservative treatment:    Ice to shoulder 1-2 times daily, for 20 minutes at a time.  Home exercise program for shoulder, including ROM and strenthening.  Instructions given to patient of what exercises to perform.  Let pain guide return to activities.      Imaging:    none.       Injection:    The risks and benefits of the  injection (which include but are not limited to: infection, bleeding,damage to nerve/artery, need for further intervention), as well as the risks and benefits of all alternative treatments were explained and understood.  The patient elected to proceed with injection. The procedure was done with aseptic technique, and the patient tolerated the procedure well with no complications.  A corticosteroid injection of the subacromial space was performed.  The patient should take 1-2 days off of activity, with gradual return to activity as tolerated.  Ice to the shoulder 1-2 times daily for 20 minutes, for next 24-48 hrs.      Surgery:     No surgery is recommended at this point, continue with conservative treatment plan as noted.      Follow up:    No follow-ups on file.      Chief Complaint   Patient presents with    Left Shoulder - Pain     CSI   Pain score 6    Right Shoulder - Pain     CSI   Pain score 2          History of Present Illness:    The patient is returns for follow up of left shoulder rotator cuff dysfunction.  She states that the previous cortisone injection that she received on 3/20/2024 did provide her significant improvement of her symptoms until approximately 1 to 2 weeks ago.  She continues to be compliant with a home exercise program.  She states that her right shoulder which she received a cortisone injection on 6/14/2024 is providing improvement of her symptoms.  She would like to repeat left cortisone injection during today's visit.  She denies any new injury or trauma to her shoulder.  She denies any numbness or tingling.         Update 12/18/24:  Patient is present in office for follow up and evaluation of bilateral shoulders, she states that the past corticosteroid injections give her a couple of months of relief, she is inquiring about repeat injections today and denies any new injuries and denies any numbness or tingling.   Shoulder Surgical History:  None    Past Medical, Social and Family  History:  Past Medical History:   Diagnosis Date    Anemia     slight    Anxiety     Depression     Dysphagia     Full dentures     GERD (gastroesophageal reflux disease)     Hiatal hernia     HL (hearing loss)     Kidney stone     Osteoporosis     RA (rheumatoid arthritis) (HCC)     Rheumatoid arthritis (HCC)     Sleep apnea     resolved since gastric sleeve surgery    Sleep difficulties     TMJ dysfunction     Wears glasses      Past Surgical History:   Procedure Laterality Date    ADENOIDECTOMY      BARIATRIC SURGERY      CHOLECYSTECTOMY      GANGLION CYST EXCISION Left 12/15/2017    Procedure: 2ND INTERSPACE MORTONS NEUROMA EXCISION;  Surgeon: Lobo Abbott DPM;  Location: [facility];  Service: Podiatry    HYSTERECTOMY      LITHOTRIPSY      PARATHYROID GLAND SURGERY      x2    SLEEVE GASTROPLASTY      TONSILLECTOMY      UPPER GASTROINTESTINAL ENDOSCOPY       Allergies   Allergen Reactions    Amoxicillin Anaphylaxis    Penicillins Anaphylaxis    Darvon [Propoxyphene]      Generalized numbness    Demerol [Meperidine] Other (See Comments)     Generalized numbness    Nsaids Other (See Comments)     S/p gastric surgery     Current Outpatient Medications on File Prior to Visit   Medication Sig Dispense Refill    acetaminophen (TYLENOL) 500 mg tablet Take 500 mg by mouth every 6 (six) hours as needed for mild pain      Ascorbic Acid (vitamin C) 1000 MG tablet Take 1,000 mg by mouth daily      Calcium Carbonate (CALCIUM 600 PO) Take 1 tablet by mouth daily      Cholecalciferol (VITAMIN D) 2000 units CAPS Take 1 capsule by mouth daily      citalopram (CeleXA) 20 mg tablet Take 20 mg by mouth daily.      cyanocobalamin (VITAMIN B-12) 1,000 mcg tablet Take 1,000 mcg by mouth daily      hydroxychloroquine (PLAQUENIL) 200 mg tablet Take 200 mg by mouth 2 (two) times a day with meals      meclizine (ANTIVERT) 12.5 MG tablet Take by mouth 3 (three) times a day as needed for dizziness      omeprazole (PriLOSEC) 20 mg delayed  release capsule Take 20 mg by mouth 2 (two) times a day        [DISCONTINUED] Multiple Vitamin (MULTIVITAMIN) capsule Take 1 capsule by mouth daily       No current facility-administered medications on file prior to visit.     Social History     Socioeconomic History    Marital status:      Spouse name: Not on file    Number of children: Not on file    Years of education: Not on file    Highest education level: Not on file   Occupational History    Not on file   Tobacco Use    Smoking status: Former    Smokeless tobacco: Never    Tobacco comments:     quit over 30 years   Vaping Use    Vaping status: Never Used   Substance and Sexual Activity    Alcohol use: Yes     Comment: socially    Drug use: No    Sexual activity: Not on file   Other Topics Concern    Not on file   Social History Narrative    ** Merged History Encounter **          Social Drivers of Health     Financial Resource Strain: Medium Risk (10/29/2024)    Received from [facility]    Overall Financial Resource Strain (CARDIA)     Difficulty of Paying Living Expenses: Somewhat hard   Food Insecurity: No Food Insecurity (10/29/2024)    Received from [facility]    Hunger Vital Sign     Worried About Running Out of Food in the Last Year: Never true     Ran Out of Food in the Last Year: Never true   Transportation Needs: No Transportation Needs (10/29/2024)    Received from [facility]    PRAPARE - Transportation     Lack of Transportation (Medical): No     Lack of Transportation (Non-Medical): No   Physical Activity: Not on file   Stress: No Stress Concern Present (10/29/2024)    Received from [facility]    [employer] [place] [employer] - Occupational Stress Questionnaire     Feeling of Stress : Not at all   Social Connections: Feeling Socially Integrated (10/29/2024)    Received from [facility]    OASIS : Social Isolation     How often do you  feel lonely or isolated from those around you?: Rarely   Intimate Partner Violence: Patient Declined (10/29/2024)    Received from [facility]    Humiliation, Afraid, Rape, and Kick questionnaire     Fear of Current or Ex-Partner: Patient declined     Emotionally Abused: Patient declined     Physically Abused: Patient declined     Sexually Abused: Patient declined   Housing Stability: Low Risk  (10/29/2024)    Received from [facility]    Housing Stability Vital Sign     Unable to Pay for Housing in the Last Year: No     Number of Times Moved in the Last Year: 0     Homeless in the Last Year: No       I have reviewed the past medical, surgical, social and family history, medications and allergies as documented in the EMR.    Review of systems: ROS is negative other than that noted in the HPI.  Constitutional: Negative for fatigue and fever.      Physical Exam:    There were no vitals taken for this visit.    General/Constitutional: NAD, well developed, well nourished  HENT: Normocephalic, atraumatic  CV: Intact distal pulses, regular rate  Resp: No respiratory distress or labored breathing  Lymphatic: No lymphadenopathy palpated  Neuro: Alert and Oriented x 3, no focal deficits  Psych: Normal mood, normal affect, normal judgement, normal behavior  Skin: Warm, dry, no rashes, no erythema      Shoulder focused exam:     Visual inspection of the shoulder demonstrates normal contour without atrophy  No evidence of scapular dyskinesia or atrophy.  No scapular winging  Active and passive range of motion demonstrates forward flexion to  actively to 100 degrees and passively to 170, abduction to 90,  external rotation is 45 with the arm the side, internal rotation to T8   Rotator cuff strength is 4/5 to resisted forward flexion, 4/5 resisted abduction, 4/5 resisted external rotation, 4/5 resisted internal rotation  No tenderness to palpation at the distal clavicle, AC joint, acromion, or  scapular spine  No pain with cross-body adduction  + Neer's test, + modified Jovel impingement test  Negative external rotation lag sign  Negative belly press, negative lift-off  + speed's and Yergason's test  Mild tenderness to palpation at the bicipital groove  - [place]'s test    UE NV Exam: +2 Radial pulses bilaterally  Sensation intact to light touch C5-T1 bilaterally, Radial/median/ulnar nerve motor intact    Cervical ROM is full without pain, numbness or tingling      Shoulder Imaging    No imaging was performed today      Scribe Attestation      I,:  Debra Ulloa PA-C am acting as a scribe while in the presence of the attending physician.:       I,:  Debra Ulloa PA-C personally performed the services described in this documentation    as scribed in my presence.:

## 2025-03-25 ENCOUNTER — TELEPHONE (OUTPATIENT)
Age: 80
End: 2025-03-25

## 2025-03-25 NOTE — TELEPHONE ENCOUNTER
Caller: Janelle     Doctor: Dr. Maloney     Reason for call: Has an appt tomorrow for injections, and she wanted to know if doctor would order an xray of her shoulder? She radha to lift up her 100 pd dog to help him up the stairs and felt something, now has a lot of pain     Call back#: [phone]

## 2025-03-26 ENCOUNTER — APPOINTMENT (OUTPATIENT)
Dept: RADIOLOGY | Facility: AMBULARY SURGERY CENTER | Age: 80
End: 2025-03-26
Attending: STUDENT IN AN ORGANIZED HEALTH CARE EDUCATION/TRAINING PROGRAM
Payer: COMMERCIAL

## 2025-03-26 ENCOUNTER — OFFICE VISIT (OUTPATIENT)
Dept: OBGYN CLINIC | Facility: CLINIC | Age: 80
End: 2025-03-26
Payer: COMMERCIAL

## 2025-03-26 VITALS — HEIGHT: 60 IN | WEIGHT: 150 LBS | BODY MASS INDEX: 29.45 KG/M2

## 2025-03-26 DIAGNOSIS — M67.911 ROTATOR CUFF DYSFUNCTION, RIGHT: ICD-10-CM

## 2025-03-26 DIAGNOSIS — M67.912 DYSFUNCTION OF LEFT ROTATOR CUFF: ICD-10-CM

## 2025-03-26 DIAGNOSIS — M67.911 ROTATOR CUFF DYSFUNCTION, RIGHT: Primary | ICD-10-CM

## 2025-03-26 PROCEDURE — 20610 DRAIN/INJ JOINT/BURSA W/O US: CPT

## 2025-03-26 PROCEDURE — 99213 OFFICE O/P EST LOW 20 MIN: CPT | Performed by: STUDENT IN AN ORGANIZED HEALTH CARE EDUCATION/TRAINING PROGRAM

## 2025-03-26 PROCEDURE — 73030 X-RAY EXAM OF SHOULDER: CPT

## 2025-03-26 RX ORDER — BUPIVACAINE HYDROCHLORIDE 2.5 MG/ML
4 INJECTION, SOLUTION INFILTRATION; PERINEURAL
Status: COMPLETED | OUTPATIENT
Start: 2025-03-26 | End: 2025-03-26

## 2025-03-26 RX ORDER — TRIAMCINOLONE ACETONIDE 40 MG/ML
40 INJECTION, SUSPENSION INTRA-ARTICULAR; INTRAMUSCULAR
Status: COMPLETED | OUTPATIENT
Start: 2025-03-26 | End: 2025-03-26

## 2025-03-26 RX ADMIN — BUPIVACAINE HYDROCHLORIDE 4 ML: 2.5 INJECTION, SOLUTION INFILTRATION; PERINEURAL at 14:15

## 2025-03-26 RX ADMIN — TRIAMCINOLONE ACETONIDE 40 MG: 40 INJECTION, SUSPENSION INTRA-ARTICULAR; INTRAMUSCULAR at 14:15

## 2025-03-26 NOTE — PROGRESS NOTES
ORTHO CARE [facility] ORTHOPEDIC CARE SPECIALISTS [place]  [address]  [address]  [place] [id number] [phone]       Janelle Maloney  [id number]  1945    ORTHOPAEDIC SURGERY OUTPATIENT NOTE  3/26/2025        :  Assessment & Plan  Dysfunction of left rotator cuff    Orders:    XR shoulder 2+ vw left; Future    Rotator cuff dysfunction, right    Orders:    XR shoulder 2+ vw right; Future        {[facility] PLAN:23080}  Follow-up:  No follow-ups on file.    The patient's diagnosis and treatment were discussed at length today. We discussed no treatment, non-operative treatment, and operative treatment.    Procedures  {NO PROCDOC:49145}    HISTORY:  79 y.o. female  {TSHPI:75534}    Surgical History:  {none surgery:14213}    Past Medical History:   Diagnosis Date    Anemia     slight    Anxiety     Depression     Dysphagia     Full dentures     GERD (gastroesophageal reflux disease)     Hiatal hernia     HL (hearing loss)     Kidney stone     Osteoporosis     RA (rheumatoid arthritis) (HCC)     Rheumatoid arthritis (HCC)     Sleep apnea     resolved since gastric sleeve surgery    Sleep difficulties     TMJ dysfunction     Wears glasses        Past Surgical History:   Procedure Laterality Date    ADENOIDECTOMY      BARIATRIC SURGERY      CHOLECYSTECTOMY      GANGLION CYST EXCISION Left 12/15/2017    Procedure: 2ND INTERSPACE MORTONS NEUROMA EXCISION;  Surgeon: Lobo Abbott DPM;  Location: [facility];  Service: Podiatry    HYSTERECTOMY      LITHOTRIPSY      PARATHYROID GLAND SURGERY      x2    SLEEVE GASTROPLASTY      TONSILLECTOMY      UPPER GASTROINTESTINAL ENDOSCOPY         Social History     Socioeconomic History    Marital status:      Spouse name: Not on file    Number of children: Not on file    Years of education: Not on file    Highest education level: Not on file   Occupational History    Not on file   Tobacco Use    Smoking status: Former    Smokeless tobacco: Never    Tobacco  comments:     quit over 30 years   Vaping Use    Vaping status: Never Used   Substance and Sexual Activity    Alcohol use: Yes     Comment: socially    Drug use: No    Sexual activity: Not on file   Other Topics Concern    Not on file   Social History Narrative    ** Merged History Encounter **          Social Drivers of Health     Financial Resource Strain: Medium Risk (10/29/2024)    Received from [facility]    Overall Financial Resource Strain (CARDIA)     Difficulty of Paying Living Expenses: Somewhat hard   Food Insecurity: No Food Insecurity (10/29/2024)    Received from [facility]    Hunger Vital Sign     Worried About Running Out of Food in the Last Year: Never true     Ran Out of Food in the Last Year: Never true   Transportation Needs: No Transportation Needs (10/29/2024)    Received from [facility]    PRAPARE - Transportation     Lack of Transportation (Medical): No     Lack of Transportation (Non-Medical): No   Physical Activity: Not on file   Stress: No Stress Concern Present (10/29/2024)    Received from [facility]    [place] [employer] - Occupational Stress Questionnaire     Feeling of Stress : Not at all   Social Connections: Feeling Socially Integrated (10/29/2024)    Received from [facility]    OASIS : Social Isolation     How often do you feel lonely or isolated from those around you?: Rarely   Intimate Partner Violence: Patient Declined (10/29/2024)    Received from [facility], [facility]    Humiliation, Afraid, Rape, and Kick questionnaire     Fear of Current or Ex-Partner: Patient declined     Emotionally Abused: Patient declined     Physically Abused: Patient declined     Sexually Abused: Patient declined   Housing Stability: Low Risk  (10/29/2024)    Received from [facility]    Housing Stability Vital Sign     Unable to Pay  for Housing in the Last Year: No     Number of Times Moved in the Last Year: 0     Homeless in the Last Year: No       Family History   Problem Relation Age of Onset    Hyperparathyroidism Father     Hyperparathyroidism Sister     Hyperparathyroidism Family         Patient's Medications   New Prescriptions    No medications on file   Previous Medications    ACETAMINOPHEN (TYLENOL) 500 MG TABLET    Take 500 mg by mouth every 6 (six) hours as needed for mild pain    ASCORBIC ACID (VITAMIN C) 1000 MG TABLET    Take 1,000 mg by mouth daily    CALCIUM CARBONATE (CALCIUM 600 PO)    Take 1 tablet by mouth daily    CHOLECALCIFEROL (VITAMIN D) 2000 UNITS CAPS    Take 1 capsule by mouth daily    CITALOPRAM (CELEXA) 20 MG TABLET    Take 20 mg by mouth daily.    CYANOCOBALAMIN (VITAMIN B-12) 1,000 MCG TABLET    Take 1,000 mcg by mouth daily    HYDROXYCHLOROQUINE (PLAQUENIL) 200 MG TABLET    Take 200 mg by mouth 2 (two) times a day with meals    OMEPRAZOLE (PRILOSEC) 20 MG DELAYED RELEASE CAPSULE    Take 20 mg by mouth 2 (two) times a day     Modified Medications    No medications on file   Discontinued Medications    MECLIZINE (ANTIVERT) 12.5 MG TABLET    Take by mouth 3 (three) times a day as needed for dizziness       Allergies   Allergen Reactions    Amoxicillin Anaphylaxis    Penicillins Anaphylaxis    Darvon [Propoxyphene]      Generalized numbness    Demerol [Meperidine] Other (See Comments)     Generalized numbness    Nsaids Other (See Comments)     S/p gastric surgery        Ht 5' (1.524 m)   Wt 68 kg (150 lb)   BMI 29.29 kg/m²      REVIEW OF SYSTEMS:  Constitutional: Negative.    HEENT: Negative.    Respiratory: Negative.    Skin: Negative.    Neurological: Negative.    Psychiatric/Behavioral: Negative.  Musculoskeletal: Negative except for that mentioned in the HPI.    Gen: No acute distress, resting comfortably in bed  HEENT: Eyes clear, moist mucus membranes, hearing intact  Respiratory: No audible wheezing or  "stridor  Cardiovascular: Well Perfused peripherally, 2+ distal pulse  Abdomen: nondistended, no peritoneal signs     PHYSICAL EXAM:      {.tsexam:90788}    IMAGING:  {STUDIES REVIEWED:25757}    Estiven Juvenal    Scribe Attestation      I,:  Estiven Sanford am acting as a scribe while in the presence of the attending physician.:       I,:  Sumit Maloney, DO personally performed the services described in this documentation    as scribed in my presence.:               Portions of the record may have been created with voice recognition software.  Occasional wrong word or \"sound a like\" substitutions may have occurred due to the inherent limitations of voice recognition software.  Read the chart carefully and recognize, using context, where substitutions have occurred. All patient's questions were answered to their satisfaction.   "

## 2025-03-26 NOTE — PROGRESS NOTES
"ORTHO CARE [facility] ORTHOPEDIC CARE SPECIALISTS [place]  [address]  [address]  [place] [id number] [phone]       Janelle Maloney  [id number]  1945    ORTHOPAEDIC SURGERY OUTPATIENT NOTE  3/26/2025    :  Assessment & Plan  Dysfunction of left rotator cuff    Orders:    XR shoulder 2+ vw left; Future    Rotator cuff dysfunction, right    Orders:    XR shoulder 2+ vw right; Future    Patient is present in office for follow up bilateral shoulder. Bilateral shoulder x-rays were obtained due to patient request and worsening pain since injury, imaging was reviewed with patient in depth. Patient previously had significant improvement with subacromial corticosteroid injections, due to previous pain relief recommended repeat injections which was given without complication and was well tolerated. Discussed that can continue us of OTC pain medication and can follow up as needed.     Follow-up:  Return if symptoms worsen or fail to improve.    The patient's diagnosis and treatment were discussed at length today. We discussed no treatment, non-operative treatment, and operative treatment.    Large joint arthrocentesis: bilateral subacromial bursa  Universal Protocol:  Consent: Verbal consent obtained. Written consent not obtained.  Risks and benefits: risks, benefits and alternatives were discussed  Consent given by: patient  Time out: Immediately prior to procedure a \"time out\" was called to verify the correct patient, procedure, equipment, support staff and site/side marked as required.  Timeout called at: 3/26/2025 3:31 PM.  Patient understanding: patient states understanding of the procedure being performed  Relevant documents: relevant documents present and verified  Test results: test results available and properly labeled  Site marked: the operative site was marked  Radiology Images displayed and confirmed. If images not available, report reviewed: imaging studies available  Patient " identity confirmed: verbally with patient, provided demographic data and hospital-assigned identification number  Supporting Documentation  Indications: pain and joint swelling   Procedure Details  Location: shoulder - bilateral subacromial bursa  Preparation: Patient was prepped and draped in the usual sterile fashion  Needle size: 22 G  Ultrasound guidance: no  Approach: posterior    Medications (Right): 4 mL bupivacaine 0.25 %; 40 mg triamcinolone acetonide 40 mg/mLMedications (Left): 4 mL bupivacaine 0.25 %; 40 mg triamcinolone acetonide 40 mg/mL   Patient tolerance: patient tolerated the procedure well with no immediate complications  Dressing:  Sterile dressing applied            HISTORY:  79 y.o. female  who is present for follow up bilateral shoulder pain. She states that the last corticosteroid injections provided significant relief, she is inquiring about repeat injections today. Notes her pain is worse with lifting and reaching and is dull and achy rated to be a 5/10. She denies any new injuries and denies any numbness or tingling.        Past Medical History:   Diagnosis Date    Anemia     slight    Anxiety     Depression     Dysphagia     Full dentures     GERD (gastroesophageal reflux disease)     Hiatal hernia     HL (hearing loss)     Kidney stone     Osteoporosis     RA (rheumatoid arthritis) (HCC)     Rheumatoid arthritis (HCC)     Sleep apnea     resolved since gastric sleeve surgery    Sleep difficulties     TMJ dysfunction     Wears glasses        Past Surgical History:   Procedure Laterality Date    ADENOIDECTOMY      BARIATRIC SURGERY      CHOLECYSTECTOMY      GANGLION CYST EXCISION Left 12/15/2017    Procedure: 2ND INTERSPACE MORTONS NEUROMA EXCISION;  Surgeon: Lobo Abbott DPM;  Location: [facility];  Service: Podiatry    HYSTERECTOMY      LITHOTRIPSY      PARATHYROID GLAND SURGERY      x2    SLEEVE GASTROPLASTY      TONSILLECTOMY      UPPER GASTROINTESTINAL ENDOSCOPY         Social  History     Socioeconomic History    Marital status:      Spouse name: Not on file    Number of children: Not on file    Years of education: Not on file    Highest education level: Not on file   Occupational History    Not on file   Tobacco Use    Smoking status: Former    Smokeless tobacco: Never    Tobacco comments:     quit over 30 years   Vaping Use    Vaping status: Never Used   Substance and Sexual Activity    Alcohol use: Yes     Comment: socially    Drug use: No    Sexual activity: Not on file   Other Topics Concern    Not on file   Social History Narrative    ** Merged History Encounter **          Social Drivers of Health     Financial Resource Strain: Medium Risk (10/29/2024)    Received from [facility]    Overall Financial Resource Strain (CARDIA)     Difficulty of Paying Living Expenses: Somewhat hard   Food Insecurity: No Food Insecurity (10/29/2024)    Received from [facility]    Hunger Vital Sign     Worried About Running Out of Food in the Last Year: Never true     Ran Out of Food in the Last Year: Never true   Transportation Needs: No Transportation Needs (10/29/2024)    Received from [facility]    PRAPARE - Transportation     Lack of Transportation (Medical): No     Lack of Transportation (Non-Medical): No   Physical Activity: Not on file   Stress: No Stress Concern Present (10/29/2024)    Received from [facility]    [place] [employer] [employer] - Occupational Stress Questionnaire     Feeling of Stress : Not at all   Social Connections: Feeling Socially Integrated (10/29/2024)    Received from [facility]    OASIS : Social Isolation     How often do you feel lonely or isolated from those around you?: Rarely   Intimate Partner Violence: Patient Declined (10/29/2024)    [facility]    Humiliation, Afraid, Rape, and Kick  questionnaire     Fear of Current or Ex-Partner: Patient declined     Emotionally Abused: Patient declined     Physically Abused: Patient declined     Sexually Abused: Patient declined   Housing Stability: Low Risk  (10/29/2024)    Received [facility]    Housing Stability Vital Sign     Unable to Pay for Housing in the Last Year: No     Number of Times Moved in the Last Year: 0     Homeless in the Last Year: No       Family History   Problem Relation Age of Onset    Hyperparathyroidism Father     Hyperparathyroidism Sister     Hyperparathyroidism Family         Patient's Medications   New Prescriptions    No medications on file   Previous Medications    ACETAMINOPHEN (TYLENOL) 500 MG TABLET    Take 500 mg by mouth every 6 (six) hours as needed for mild pain    ASCORBIC ACID (VITAMIN C) 1000 MG TABLET    Take 1,000 mg by mouth daily    CALCIUM CARBONATE (CALCIUM 600 PO)    Take 1 tablet by mouth daily    CHOLECALCIFEROL (VITAMIN D) 2000 UNITS CAPS    Take 1 capsule by mouth daily    CITALOPRAM (CELEXA) 20 MG TABLET    Take 20 mg by mouth daily.    CYANOCOBALAMIN (VITAMIN B-12) 1,000 MCG TABLET    Take 1,000 mcg by mouth daily    HYDROXYCHLOROQUINE (PLAQUENIL) 200 MG TABLET    Take 200 mg by mouth 2 (two) times a day with meals    OMEPRAZOLE (PRILOSEC) 20 MG DELAYED RELEASE CAPSULE    Take 20 mg by mouth 2 (two) times a day     Modified Medications    No medications on file   Discontinued Medications    MECLIZINE (ANTIVERT) 12.5 MG TABLET    Take by mouth 3 (three) times a day as needed for dizziness       Allergies   Allergen Reactions    Amoxicillin Anaphylaxis    Penicillins Anaphylaxis    Darvon [Propoxyphene]      Generalized numbness    Demerol [Meperidine] Other (See Comments)     Generalized numbness    Nsaids Other (See Comments)     S/p gastric surgery        Ht 5' (1.524 m)   Wt 68 kg (150 lb)   BMI 29.29 kg/m²      REVIEW OF SYSTEMS:  Constitutional: Negative.    HEENT: Negative.   "  Respiratory: Negative.    Skin: Negative.    Neurological: Negative.    Psychiatric/Behavioral: Negative.  Musculoskeletal: Negative except for that mentioned in the HPI.    Gen: No acute distress, resting comfortably in bed  HEENT: Eyes clear, moist mucus membranes, hearing intact  Respiratory: No audible wheezing or stridor  Cardiovascular: Well Perfused peripherally, 2+ distal pulse  Abdomen: nondistended, no peritoneal signs     PHYSICAL EXAM:      Visual inspection of the shoulder demonstrates normal contour without atrophy  No evidence of scapular dyskinesia or atrophy.  No scapular winging  Active and passive range of motion demonstrates forward flexion to  actively to 100 degrees and passively to 170, abduction to 90,  external rotation is 45 with the arm the side, internal rotation to T8   Rotator cuff strength is 4/5 to resisted forward flexion, 4/5 resisted abduction, 4/5 resisted external rotation, 4/5 resisted internal rotation  No tenderness to palpation at the distal clavicle, AC joint, acromion, or scapular spine  No pain with cross-body adduction  + Neer's test, + modified Jovel impingement test  Negative external rotation lag sign  Negative belly press, negative lift-off  + speed's and Yergason's test  Mild tenderness to palpation at the bicipital groove  - Austin's test    IMAGING:  XR of bilateral shoulder - AC joint and mild glenohumeral degenerative changes without acute osseous abnormality. Do not have radiologists interpretation and will review once available    Debra Ulloa PA-C    Scribe Attestation      I,:  Debra Ulloa PA-C am acting as a scribe while in the presence of the attending physician.:       I,:  Debra Ulloa PA-C personally performed the services described in this documentation    as scribed in my presence.:               Portions of the record may have been created with voice recognition software.  Occasional wrong word or \"sound a like\" substitutions may have occurred " due to the inherent limitations of voice recognition software.  Read the chart carefully and recognize, using context, where substitutions have occurred. All patient's questions were answered to their satisfaction.

## 2025-03-27 ENCOUNTER — OFFICE VISIT (OUTPATIENT)
Dept: FAMILY MEDICINE CLINIC | Facility: CLINIC | Age: 80
End: 2025-03-27
Payer: COMMERCIAL

## 2025-03-27 VITALS
BODY MASS INDEX: 27.72 KG/M2 | SYSTOLIC BLOOD PRESSURE: 118 MMHG | DIASTOLIC BLOOD PRESSURE: 72 MMHG | OXYGEN SATURATION: 96 % | HEART RATE: 89 BPM | WEIGHT: 141.2 LBS | HEIGHT: 60 IN | TEMPERATURE: 98.2 F | RESPIRATION RATE: 16 BRPM

## 2025-03-27 DIAGNOSIS — N18.31 STAGE 3A CHRONIC KIDNEY DISEASE (HCC): ICD-10-CM

## 2025-03-27 DIAGNOSIS — M05.742 RHEUMATOID ARTHRITIS INVOLVING BOTH HANDS WITH POSITIVE RHEUMATOID FACTOR (HCC): Primary | ICD-10-CM

## 2025-03-27 DIAGNOSIS — F32.A DEPRESSION, UNSPECIFIED DEPRESSION TYPE: ICD-10-CM

## 2025-03-27 DIAGNOSIS — M81.0 OSTEOPOROSIS WITHOUT CURRENT PATHOLOGICAL FRACTURE, UNSPECIFIED OSTEOPOROSIS TYPE: ICD-10-CM

## 2025-03-27 DIAGNOSIS — D35.01 ADENOMA OF RIGHT ADRENAL GLAND: ICD-10-CM

## 2025-03-27 DIAGNOSIS — M05.741 RHEUMATOID ARTHRITIS INVOLVING BOTH HANDS WITH POSITIVE RHEUMATOID FACTOR (HCC): Primary | ICD-10-CM

## 2025-03-27 DIAGNOSIS — J84.9 INTERSTITIAL LUNG DISEASE (HCC): ICD-10-CM

## 2025-03-27 DIAGNOSIS — Z13.1 SCREENING FOR DIABETES MELLITUS: ICD-10-CM

## 2025-03-27 DIAGNOSIS — E21.3 HYPERPARATHYROIDISM (HCC): ICD-10-CM

## 2025-03-27 DIAGNOSIS — Z13.220 SCREENING FOR HYPERLIPIDEMIA: ICD-10-CM

## 2025-03-27 PROCEDURE — G2211 COMPLEX E/M VISIT ADD ON: HCPCS | Performed by: FAMILY MEDICINE

## 2025-03-27 PROCEDURE — 99214 OFFICE O/P EST MOD 30 MIN: CPT | Performed by: FAMILY MEDICINE

## 2025-03-27 NOTE — ASSESSMENT & PLAN NOTE
No acute issues, no medications. Last CT 4/2023 showed moderate bibasilar predominant reticular subpleural fibrotic change of the lungs that had little progression since previous imaging in 2020. Does not follow with pulmonology.

## 2025-03-27 NOTE — ASSESSMENT & PLAN NOTE
Not following with rheumatology? On Plaquenil. Sees ortho for joint pains, has steroid injections in the [place] every 3 months.

## 2025-03-27 NOTE — PROGRESS NOTES
Name: Janelle Maloney      : 1945      MRN: [id number]  Encounter Provider: Urmila Mackey DO  Encounter Date: 3/27/2025   Encounter department: [facility]    Assessment & Plan  Rheumatoid arthritis involving both hands with positive rheumatoid factor (HCC)  Not following with rheumatology? On Plaquenil. Sees ortho for joint pains, has steroid injections in the [place] every 3 months.       Interstitial lung disease (HCC)  No acute issues, no medications. Last CT 2023 showed moderate bibasilar predominant reticular subpleural fibrotic change of the lungs that had little progression since previous imaging in . Does not follow with pulmonology.       Stage 3a chronic kidney disease (HCC)  Lab Results   Component Value Date    EGFR 54 2023    EGFR 61 2022    EGFR 81 01/10/2020    CREATININE 1.01 2023    CREATININE 0.92 2022    CREATININE 0.91 2021    Has labs ordered         Osteoporosis without current pathological fracture, unspecified osteoporosis type  Last DXA 2024. Was on Prolia but does not have good insurance coverage, unsure of last dose, probably in the last year. Taking Calcium and Vitamin D. Last saw endocrinology in 2019.  Orders:  •  Ambulatory Referral to Endocrinology; Future    Adenoma of right adrenal gland    Orders:  •  Ambulatory Referral to Endocrinology; Future    Hyperparathyroidism (HCC)    Orders:  •  Comprehensive metabolic panel; Future  •  Ambulatory Referral to Endocrinology; Future  •  PTH, intact; Future  •  Vitamin D 25 hydroxy; Future    Screening for diabetes mellitus    Orders:  •  Hemoglobin A1C; Future    Screening for hyperlipidemia    Orders:  •  Lipid panel; Future    Depression, unspecified depression type  Stable on Celexa for years, continue current treatment.          Preventive health issues were discussed with patient, and age appropriate screening tests were ordered as noted in patient's After  Visit Summary. Personalized health advice and appropriate referrals for health education or preventive services given if needed, as noted in patient's After Visit Summary.    History of Present Illness     HPI     Here to establish care. Chronic conditions discussed.  Declines mammogram.  Labs ordered.    Feels unsteady on her feet. Has equilibrium problem, takes Meclizine intermittently. Declines PT. Will try Meclizine and see if she still stumbles. Balance issue has been for 12+ years.        Patient Care Team:  Urmila Mackey DO as PCP - General (Family Medicine)  Ankita Sotomayor MD as PCP - Endocrinology (Endocrinology)  MD Thais Turner PA-C    Review of Systems  Medical History Reviewed by provider this encounter:  [facility]       Annual Wellness Visit Questionnaire   Last Medicare Wellness visit information reviewed, patient interviewed and updates made to the record today.      Health Risk Assessment:   Patient rates overall health as good. Patient feels that their physical health rating is same. Patient is satisfied with their life. Eyesight was rated as much worse. Hearing was rated as slightly worse. Patient feels that their emotional and mental health rating is same. Patients states they are never, rarely angry. Patient states they are always unusually tired/fatigued. Pain experienced in the last 7 days has been a lot. Patient's pain rating has been 10/10. Patient states that she has experienced no weight loss or gain in last 6 months.     Depression Screening:   PHQ-2 Score: 0      PREVENTIVE SCREENINGS      Cardiovascular Screening:    General: Screening Current      Cervical Cancer Screening:    General: Screening Not Indicated      Osteoporosis Screening:    General: Screening Not Indicated and History Osteoporosis      Lung Cancer Screening:     General: Screening Not Indicated    Social Drivers of Health     Financial Resource Strain: Medium Risk (10/29/2024)    Received from  [facility]    Overall Financial Resource Strain (CARDIA)    • Difficulty of Paying Living Expenses: Somewhat hard   Food Insecurity: No Food Insecurity (10/29/2024)    Received from [facility]    Hunger Vital Sign    • Worried About Running Out of Food in the Last Year: Never true    • Ran Out of Food in the Last Year: Never true   Transportation Needs: No Transportation Needs (10/29/2024)    Received from [facility]    PRAPARE - Transportation    • Lack of Transportation (Medical): No    • Lack of Transportation (Non-Medical): No   Housing Stability: Low Risk  (10/29/2024)    Received from [facility]    Housing Stability Vital Sign    • Unable to Pay for Housing in the Last Year: No    • Number of Times Moved in the Last Year: 0    • Homeless in the Last Year: No   Utilities: Not At Risk (10/29/2024)    Received from [facility]    • Threatened with loss of utilities: No     No results found.    Objective   /72 (BP Location: Right arm, Patient Position: Sitting, Cuff Size: Standard)   Pulse 89   Temp 98.2 °F (36.8 °C) (Temporal)   Resp 16   Ht 5' (1.524 m)   Wt 64 kg (141 lb 3.2 oz)   SpO2 96%   BMI 27.58 kg/m²     Physical Exam  Vitals reviewed.   Constitutional:       Appearance: Normal appearance.   Cardiovascular:      Rate and Rhythm: Normal rate and regular rhythm.      Heart sounds: Normal heart sounds.   Pulmonary:      Effort: Pulmonary effort is normal.      Breath sounds: Normal breath sounds.   Abdominal:      General: Abdomen is flat.   Skin:     General: Skin is warm and dry.   Neurological:      General: No focal deficit present.      Mental Status: She is alert and oriented to person, place, and time.   Psychiatric:         Mood and Affect: Mood normal.         Behavior: Behavior normal.

## 2025-03-27 NOTE — ASSESSMENT & PLAN NOTE
Orders:  •  Comprehensive metabolic panel; Future  •  Ambulatory Referral to Endocrinology; Future  •  PTH, intact; Future  •  Vitamin D 25 hydroxy; Future

## 2025-03-27 NOTE — ASSESSMENT & PLAN NOTE
Lab Results   Component Value Date    EGFR 54 02/21/2023    EGFR 61 02/13/2022    EGFR 81 01/10/2020    CREATININE 1.01 02/21/2023    CREATININE 0.92 02/13/2022    CREATININE 0.91 12/07/2021    Has labs ordered

## 2025-03-27 NOTE — ASSESSMENT & PLAN NOTE
Last DXA 4/2024. Was on Prolia but does not have good insurance coverage, unsure of last dose, probably in the last year. Taking Calcium and Vitamin D. Last saw endocrinology in 2019.  Orders:  •  Ambulatory Referral to Endocrinology; Future

## 2025-04-23 DIAGNOSIS — F32.A DEPRESSION, UNSPECIFIED DEPRESSION TYPE: Primary | ICD-10-CM

## 2025-04-23 RX ORDER — CITALOPRAM HYDROBROMIDE 20 MG/1
20 TABLET ORAL DAILY
Qty: 90 TABLET | Refills: 1 | Status: SHIPPED | OUTPATIENT
Start: 2025-04-23

## 2025-04-23 NOTE — TELEPHONE ENCOUNTER
Patient new to Dr Mackey, needs refill for her citalopram (CeleXA) 20 mg   She takes one daily  Please send to [facility]

## 2025-06-25 ENCOUNTER — OFFICE VISIT (OUTPATIENT)
Dept: OBGYN CLINIC | Facility: CLINIC | Age: 80
End: 2025-06-25
Payer: COMMERCIAL

## 2025-06-25 VITALS — WEIGHT: 138.8 LBS | BODY MASS INDEX: 27.25 KG/M2 | HEIGHT: 60 IN

## 2025-06-25 DIAGNOSIS — M67.912 DYSFUNCTION OF LEFT ROTATOR CUFF: Primary | ICD-10-CM

## 2025-06-25 DIAGNOSIS — M67.911 ROTATOR CUFF DYSFUNCTION, RIGHT: ICD-10-CM

## 2025-06-25 PROCEDURE — 20610 DRAIN/INJ JOINT/BURSA W/O US: CPT

## 2025-06-25 PROCEDURE — 99213 OFFICE O/P EST LOW 20 MIN: CPT | Performed by: STUDENT IN AN ORGANIZED HEALTH CARE EDUCATION/TRAINING PROGRAM

## 2025-06-25 RX ORDER — BUPIVACAINE HYDROCHLORIDE 2.5 MG/ML
4 INJECTION, SOLUTION INFILTRATION; PERINEURAL
Status: COMPLETED | OUTPATIENT
Start: 2025-06-25 | End: 2025-06-25

## 2025-06-25 RX ORDER — TRIAMCINOLONE ACETONIDE 40 MG/ML
40 INJECTION, SUSPENSION INTRA-ARTICULAR; INTRAMUSCULAR
Status: COMPLETED | OUTPATIENT
Start: 2025-06-25 | End: 2025-06-25

## 2025-06-25 RX ORDER — MOXIFLOXACIN 5 MG/ML
SOLUTION/ DROPS OPHTHALMIC
COMMUNITY
Start: 2025-04-15

## 2025-06-25 RX ADMIN — BUPIVACAINE HYDROCHLORIDE 4 ML: 2.5 INJECTION, SOLUTION INFILTRATION; PERINEURAL at 13:15

## 2025-06-25 RX ADMIN — TRIAMCINOLONE ACETONIDE 40 MG: 40 INJECTION, SUSPENSION INTRA-ARTICULAR; INTRAMUSCULAR at 13:15

## 2025-06-25 NOTE — PROGRESS NOTES
"[facility]  [address] [address] [facility] [address] [facility]  [address]  [place] [id number] [phone]       Janelle Maloney  [id number]  1945    ORTHOPAEDIC SURGERY OUTPATIENT NOTE  6/25/2025  Assessment & Plan  Dysfunction of left rotator cuff  CSI of left subacromial bursa  was performed  Activity as tolerated  Home exercise program reviewed  Anti-inflammatories or Tylenol prn pain  Ice and heat as needed for pain relief  Return in about 3 months (around 9/25/2025) for Possible repeat cortisone injection.    Orders:    Large joint arthrocentesis    Rotator cuff dysfunction, right  CSI of right subacromial bursa  was performed  Activity as tolerated  Home exercise program reviewed  Anti-inflammatories or Tylenol prn pain  Ice and heat as needed for pain relief  Return in about 3 months (around 9/25/2025) for Possible repeat cortisone injection.    Orders:    Large joint arthrocentesis        The patient's diagnosis and treatment were discussed at length today. We discussed no treatment, non-operative treatment, and operative treatment.    Large joint arthrocentesis: R subacromial bursa    Performed by: Debra Ulloa PA-C  Authorized by: Sumit Maloney DO    Universal Protocol:  procedure performed by consultantConsent: Verbal consent obtained  Risks and benefits: risks, benefits and alternatives were discussed  Consent given by: patient  Time out: Immediately prior to procedure a \"time out\" was called to verify the correct patient, procedure, equipment, support staff and site/side marked as required.  Timeout called at: 6/25/2025 1:26 PM.  Patient understanding: patient states understanding of the procedure being performed  Patient consent: the patient's understanding of the procedure matches consent given  Site marked: the operative site was marked  Patient identity confirmed: verbally with patient  Supporting Documentation  Indications: pain and diagnostic evaluation " "    Is this a Visco injection? NoProcedure Details  Location: shoulder - R subacromial bursa  Preparation: Patient was prepped and draped in the usual sterile fashion  Needle size: 22 G  Ultrasound guidance: no  Approach: posterior  Medications administered: 4 mL bupivacaine 0.25 %; 40 mg triamcinolone acetonide 40 mg/mL    Patient tolerance: patient tolerated the procedure well with no immediate complications  Dressing:  Sterile dressing applied      Large joint arthrocentesis: L subacromial bursa    Performed by: Debra Ulloa PA-C  Authorized by: Sumit Maloney DO    Universal Protocol:  Consent: Verbal consent obtained  Risks and benefits: risks, benefits and alternatives were discussed  Consent given by: patient  Time out: Immediately prior to procedure a \"time out\" was called to verify the correct patient, procedure, equipment, support staff and site/side marked as required.  Timeout called at: 6/25/2025 1:26 PM.  Patient understanding: patient states understanding of the procedure being performed  Patient consent: the patient's understanding of the procedure matches consent given  Site marked: the operative site was marked  Patient identity confirmed: verbally with patient  Supporting Documentation  Indications: pain and diagnostic evaluation     Is this a Visco injection? NoProcedure Details  Location: shoulder - L subacromial bursa  Preparation: Patient was prepped and draped in the usual sterile fashion  Needle size: 22 G  Ultrasound guidance: no  Approach: posterior  Medications administered: 4 mL bupivacaine 0.25 %; 40 mg triamcinolone acetonide 40 mg/mL    Patient tolerance: patient tolerated the procedure well with no immediate complications  Dressing:  Sterile dressing applied             Translation: N/A    HISTORY:  79 y.o. female  who returns today for follow-up evaluation bilateral rotator cuff dysfunction.  She states that the previous subacromial bursal injections that she received on " "3/26/2025 did provide her significant improvement of her symptoms until approximately 1 to 2 weeks ago.  She states that she continues to have bilateral anterior and lateral shoulder pain that is worse with repetitive overhead lifting as well as reaching behind her back.  She denies any new injury or trauma to her shoulders.  She denies any numbness or tingling.  She would like to repeat bilateral subacromial bursal injections again at today's visit as she continues to receive relief of her symptoms.    Surgical History:  None    Previous Injection(s):  Left subacromial bursal injections on 7/3/2023, 3/20/2024, and 9/4/2024  Right subacromial bursa on 6/14/2024  Bilateral subacromial bursal injections on 12/18/2024      The following portions of the patient's history were reviewed and updated as appropriate: allergies, current medications, past family history, past social history, past surgical history and problem list.    Ht 5' (1.524 m)   Wt 63 kg (138 lb 12.8 oz)   BMI 27.11 kg/m²    No results found for: \"HGBA1C\"      Past Medical History[1]    Past Surgical History[2]    Social History     Socioeconomic History    Marital status:      Spouse name: Not on file    Number of children: Not on file    Years of education: Not on file    Highest education level: Not on file   Occupational History    Not on file   Tobacco Use    Smoking status: Former    Smokeless tobacco: Never    Tobacco comments:     quit over 30 years   Vaping Use    Vaping status: Never Used   Substance and Sexual Activity    Alcohol use: Yes     Comment: socially    Drug use: No    Sexual activity: Not on file   Other Topics Concern    Not on file   Social History Narrative    ** Merged History Encounter **          Social Drivers of Health     Financial Resource Strain: Medium Risk (10/29/2024)    Received from [facility]    Overall Financial Resource Strain (CARDIA)     Difficulty of Paying Living Expenses: Somewhat " hard   Food Insecurity: No Food Insecurity (10/29/2024)    Received from [facility]    Hunger Vital Sign     Within the past 12 months, you worried that your food would run out before you got the money to buy more.: Never true     Within the past 12 months, the food you bought just didn't last and you didn't have money to get more.: Never true   Transportation Needs: No Transportation Needs (10/29/2024)    Received from [facility]    PRAPARE - Transportation     Lack of Transportation (Medical): No     Lack of Transportation (Non-Medical): No   Physical Activity: Not on file   Stress: No Stress Concern Present (10/29/2024)    Received from [facility]    [employer] [place] [employer] - Occupational Stress Questionnaire     Feeling of Stress : Not at all   Social Connections: Feeling Socially Integrated (10/29/2024)    Received from [facility]    OASIS : Social Isolation     How often do you feel lonely or isolated from those around you?: Rarely   Intimate Partner Violence: Patient Declined (10/29/2024)    Received from [facility]    Humiliation, Afraid, Rape, and Kick questionnaire     Within the last year, have you been afraid of your partner or ex-partner?: Patient declined     Within the last year, have you been humiliated or emotionally abused in other ways by your partner or ex-partner?: Patient declined     Within the last year, have you been kicked, hit, slapped, or otherwise physically hurt by your partner or ex-partner?: Patient declined     Within the last year, have you been raped or forced to have any kind of sexual activity by your partner or ex-partner?: Patient declined   Housing Stability: Low Risk  (10/29/2024)    Received from [facility]    Housing Stability Vital Sign     In the last 12 months, was there a time when you were not able to pay the mortgage or rent on time?: No      In the past 12 months, how many times have you moved where you were living?: 0     At any time in the past 12 months, were you homeless or living in a shelter (including now)?: No       Family History[3]     Patient's Medications   New Prescriptions    No medications on file   Previous Medications    ACETAMINOPHEN (TYLENOL) 500 MG TABLET    Take 500 mg by mouth every 6 (six) hours as needed for mild pain    ASCORBIC ACID (VITAMIN C) 1000 MG TABLET    Take 1,000 mg by mouth in the morning.    CALCIUM CARBONATE (CALCIUM 600 PO)    Take 1 tablet by mouth in the morning.    CHOLECALCIFEROL (VITAMIN D) 2000 UNITS CAPS    Take 1 capsule by mouth in the morning.    CITALOPRAM (CELEXA) 20 MG TABLET    Take 1 tablet (20 mg total) by mouth daily    CYANOCOBALAMIN (VITAMIN B-12) 1,000 MCG TABLET    Take 1,000 mcg by mouth in the morning.    HYDROXYCHLOROQUINE (PLAQUENIL) 200 MG TABLET    Take 200 mg by mouth in the morning and 200 mg in the evening. Take with meals.    MOXIFLOXACIN (VIGAMOX) 0.5 % OPHTHALMIC SOLUTION        OMEPRAZOLE (PRILOSEC) 20 MG DELAYED RELEASE CAPSULE    Take 20 mg by mouth in the morning and 20 mg in the evening.   Modified Medications    No medications on file   Discontinued Medications    No medications on file       Allergies[4]       REVIEW OF SYSTEMS:  Constitutional: Negative.    HEENT: Negative.    Respiratory: Negative.    Skin: Negative.    Neurological: Negative.    Psychiatric/Behavioral: Negative.  Musculoskeletal: Negative except for that mentioned in the HPI.    Gen: No acute distress, resting comfortably in bed  HEENT: Eyes clear, moist mucus membranes, hearing intact  Respiratory: No audible wheezing or stridor  Cardiovascular: Well Perfused peripherally, 2+ distal pulse  Abdomen: nondistended, no peritoneal signs     PHYSICAL EXAM:      Bilateral Shoulder Exam  Alignment / Posture:  Normal shoulder posture. Normal scapular position. No scapular dyskinesis or winging.  Inspection:  No  "swelling. No edema. No erythema. No ecchymosis. No muscle atrophy. No deformity.  Palpation:  Bilateral subacromial tenderness. No effusion. No warmth. No crepitus. No clicking, catching, or snapping.  ROM:  Active and passive range of motion demonstrates forward flexion to  actively to 100 degrees and passively to 170, abduction to 90,  external rotation is 45 with the arm the side, internal rotation to T8   Strength:  4/5 to resisted forward flexion, 4/5 resisted abduction, 4/5 resisted external rotation, 4/5 resisted internal rotation  Stability:  No objective shoulder instability.  Tests: (+) Neer. (+) Speed.  Neurovascular:  Sensation intact in Ax/R/M/U nerve distributions. 2+ radial pulse.    IMAGING:  No studies to review.     Electronic Medical Records were reviewed including his office visit notes    Estiven Sanford    Scribe Attestation      I,:  Estiven Sanford am acting as a scribe while in the presence of the attending physician.:       I,:  Sumit Maloney, DO personally performed the services described in this documentation    as scribed in my presence.:               Portions of the record may have been created with voice recognition software.  Occasional wrong word or \"sound a like\" substitutions may have occurred due to the inherent limitations of voice recognition software.  Read the chart carefully and recognize, using context, where substitutions have occurred. All patient's questions were answered to their satisfaction.          [1]   Past Medical History:  Diagnosis Date    Anemia     slight    Anxiety     Depression     Dysphagia     Full dentures     GERD (gastroesophageal reflux disease)     Hiatal hernia     HL (hearing loss)     Kidney stone     Osteoporosis     RA (rheumatoid arthritis) (HCC)     Rheumatoid arthritis (HCC)     Sleep apnea     resolved since gastric sleeve surgery    Sleep difficulties     TMJ dysfunction     Wears glasses    [2]   Past Surgical History:  Procedure " Laterality Date    ADENOIDECTOMY      BARIATRIC SURGERY      CHOLECYSTECTOMY      GANGLION CYST EXCISION Left 12/15/2017    Procedure: 2ND INTERSPACE MORTONS NEUROMA EXCISION;  Surgeon: Lobo Abbott DPM;  Location: [facility];  Service: Podiatry    HYSTERECTOMY      LITHOTRIPSY      PARATHYROID GLAND SURGERY      x2    SLEEVE GASTROPLASTY      TONSILLECTOMY      UPPER GASTROINTESTINAL ENDOSCOPY     [3]   Family History  Problem Relation Name Age of Onset    Hyperparathyroidism Father      Hyperparathyroidism Sister      Hyperparathyroidism Family     [4]   Allergies  Allergen Reactions    Amoxicillin Anaphylaxis    Penicillins Anaphylaxis    Darvon [Propoxyphene]      Generalized numbness    Demerol [Meperidine] Other (See Comments)     Generalized numbness    Nsaids Other (See Comments)     S/p gastric surgery

## 2025-07-05 ENCOUNTER — APPOINTMENT (OUTPATIENT)
Dept: LAB | Facility: CLINIC | Age: 80
End: 2025-07-05
Payer: COMMERCIAL

## 2025-07-05 DIAGNOSIS — E21.3 HYPERPARATHYROIDISM (HCC): ICD-10-CM

## 2025-07-05 DIAGNOSIS — Z13.220 SCREENING FOR HYPERLIPIDEMIA: ICD-10-CM

## 2025-07-05 DIAGNOSIS — Z13.1 SCREENING FOR DIABETES MELLITUS: ICD-10-CM

## 2025-07-05 LAB
25(OH)D3 SERPL-MCNC: 89.8 NG/ML (ref 30–100)
ALBUMIN SERPL BCG-MCNC: 3.4 G/DL (ref 3.5–5)
ALP SERPL-CCNC: 44 U/L (ref 34–104)
ALT SERPL W P-5'-P-CCNC: 13 U/L (ref 7–52)
ANION GAP SERPL CALCULATED.3IONS-SCNC: 7 MMOL/L (ref 4–13)
AST SERPL W P-5'-P-CCNC: 21 U/L (ref 13–39)
BILIRUB SERPL-MCNC: 0.37 MG/DL (ref 0.2–1)
BUN SERPL-MCNC: 15 MG/DL (ref 5–25)
CALCIUM ALBUM COR SERPL-MCNC: 10.2 MG/DL (ref 8.3–10.1)
CALCIUM SERPL-MCNC: 9.7 MG/DL (ref 8.4–10.2)
CHLORIDE SERPL-SCNC: 106 MMOL/L (ref 96–108)
CHOLEST SERPL-MCNC: 216 MG/DL (ref ?–200)
CO2 SERPL-SCNC: 26 MMOL/L (ref 21–32)
CREAT SERPL-MCNC: 0.62 MG/DL (ref 0.6–1.3)
EST. AVERAGE GLUCOSE BLD GHB EST-MCNC: 108 MG/DL
GFR SERPL CREATININE-BSD FRML MDRD: 87 ML/MIN/1.73SQ M
GLUCOSE P FAST SERPL-MCNC: 80 MG/DL (ref 65–99)
HBA1C MFR BLD: 5.4 %
HDLC SERPL-MCNC: 62 MG/DL
LDLC SERPL CALC-MCNC: 136 MG/DL (ref 0–100)
NONHDLC SERPL-MCNC: 154 MG/DL
POTASSIUM SERPL-SCNC: 4.1 MMOL/L (ref 3.5–5.3)
PROT SERPL-MCNC: 5.9 G/DL (ref 6.4–8.4)
PTH-INTACT SERPL-MCNC: 61 PG/ML (ref 12–88)
SODIUM SERPL-SCNC: 139 MMOL/L (ref 135–147)
TRIGL SERPL-MCNC: 90 MG/DL (ref ?–150)

## 2025-07-05 PROCEDURE — 83036 HEMOGLOBIN GLYCOSYLATED A1C: CPT

## 2025-07-05 PROCEDURE — 80061 LIPID PANEL: CPT

## 2025-07-05 PROCEDURE — 80053 COMPREHEN METABOLIC PANEL: CPT

## 2025-07-05 PROCEDURE — 82306 VITAMIN D 25 HYDROXY: CPT

## 2025-07-05 PROCEDURE — 36415 COLL VENOUS BLD VENIPUNCTURE: CPT

## 2025-07-05 PROCEDURE — 83970 ASSAY OF PARATHORMONE: CPT

## 2025-07-11 ENCOUNTER — TELEPHONE (OUTPATIENT)
Age: 80
End: 2025-07-11

## 2025-07-24 ENCOUNTER — NEW PATIENT COMPREHENSIVE (OUTPATIENT)
Dept: URBAN - METROPOLITAN AREA CLINIC 6 | Facility: CLINIC | Age: 80
End: 2025-07-24

## 2025-07-24 DIAGNOSIS — H17.811: ICD-10-CM

## 2025-07-24 DIAGNOSIS — H25.813: ICD-10-CM

## 2025-07-24 DIAGNOSIS — H43.813: ICD-10-CM

## 2025-07-24 PROCEDURE — 99204 OFFICE O/P NEW MOD 45 MIN: CPT

## 2025-07-24 ASSESSMENT — VISUAL ACUITY
OS_CC: 20/40-1
OD_CC: 20/40-1

## 2025-07-30 ENCOUNTER — OFFICE VISIT (OUTPATIENT)
Dept: FAMILY MEDICINE CLINIC | Facility: CLINIC | Age: 80
End: 2025-07-30
Payer: COMMERCIAL

## 2025-07-30 VITALS
TEMPERATURE: 97.6 F | DIASTOLIC BLOOD PRESSURE: 70 MMHG | OXYGEN SATURATION: 98 % | WEIGHT: 138.8 LBS | SYSTOLIC BLOOD PRESSURE: 120 MMHG | RESPIRATION RATE: 18 BRPM | HEART RATE: 73 BPM | BODY MASS INDEX: 27.25 KG/M2 | HEIGHT: 60 IN

## 2025-07-30 DIAGNOSIS — J18.9 PNEUMONIA DUE TO INFECTIOUS ORGANISM, UNSPECIFIED LATERALITY, UNSPECIFIED PART OF LUNG: Primary | ICD-10-CM

## 2025-07-30 PROCEDURE — 99213 OFFICE O/P EST LOW 20 MIN: CPT | Performed by: FAMILY MEDICINE

## 2025-07-30 PROCEDURE — G2211 COMPLEX E/M VISIT ADD ON: HCPCS | Performed by: FAMILY MEDICINE

## 2025-07-30 RX ORDER — CODEINE PHOSPHATE AND GUAIFENESIN 10; 100 MG/5ML; MG/5ML
5 SOLUTION ORAL
Qty: 100 ML | Refills: 0 | Status: SHIPPED | OUTPATIENT
Start: 2025-07-30

## 2025-07-30 RX ORDER — BENZONATATE 200 MG/1
200 CAPSULE ORAL 3 TIMES DAILY PRN
COMMUNITY
Start: 2025-07-27 | End: 2025-08-03

## 2025-07-30 RX ORDER — DOXYCYCLINE HYCLATE 100 MG
100 TABLET ORAL 2 TIMES DAILY
COMMUNITY
Start: 2025-07-27 | End: 2025-08-06

## 2025-08-05 DIAGNOSIS — M05.741 RHEUMATOID ARTHRITIS INVOLVING BOTH HANDS WITH POSITIVE RHEUMATOID FACTOR (HCC): Primary | ICD-10-CM

## 2025-08-05 DIAGNOSIS — M05.742 RHEUMATOID ARTHRITIS INVOLVING BOTH HANDS WITH POSITIVE RHEUMATOID FACTOR (HCC): Primary | ICD-10-CM

## 2025-08-07 RX ORDER — HYDROXYCHLOROQUINE SULFATE 200 MG/1
200 TABLET, FILM COATED ORAL
Qty: 200 TABLET | Refills: 1 | Status: SHIPPED | OUTPATIENT
Start: 2025-08-07 | End: 2025-11-15

## (undated) DEVICE — PAD CAST 4 IN COTTON NON STERILE

## (undated) DEVICE — CAST PADDING 4 IN SYNTHETIC NON-STRL

## (undated) DEVICE — CHLORAPREP HI-LITE 26ML ORANGE

## (undated) DEVICE — KERLIX BANDAGE ROLL: Brand: KERLIX

## (undated) DEVICE — ACE WRAP 4 IN UNSTERILE

## (undated) DEVICE — SYRINGE 10ML LL

## (undated) DEVICE — ASTOUND STANDARD SURGICAL GOWN, XXL: Brand: CONVERTORS

## (undated) DEVICE — STOCKINETTE REGULAR

## (undated) DEVICE — SUT MONOCRYL 4-0 PS-2 27 IN Y426H

## (undated) DEVICE — SUT VICRYL 4-0 PS-2 27 IN J426H

## (undated) DEVICE — CURITY NON-ADHERENT STRIPS: Brand: CURITY

## (undated) DEVICE — GLOVE INDICATOR PI UNDERGLOVE SZ 8 BLUE

## (undated) DEVICE — GLOVE SRG BIOGEL 8

## (undated) DEVICE — 2000CC GUARDIAN II: Brand: GUARDIAN

## (undated) DEVICE — POV-IOD SOLUTION 4OZ BT

## (undated) DEVICE — GAUZE SPONGES,16 PLY: Brand: CURITY

## (undated) DEVICE — CUFF TOURNIQUET 18 X 4 IN QUICK CONNECT DISP 1 BLADDER

## (undated) DEVICE — NEEDLE 18 G X 1 1/2

## (undated) DEVICE — NEEDLE 25G X 1 1/2

## (undated) DEVICE — UNIVERSAL  MINOR EXTREMITY PK: Brand: CARDINAL HEALTH

## (undated) DEVICE — SUT PDS II 4-0 SH 27 IN Z315H

## (undated) DEVICE — 3M™ STERI-STRIP™ REINFORCED ADHESIVE SKIN CLOSURES, R1542, 1/4 IN X 1-1/2 IN (6 MM X 38 MM), 6 STRIPS/ENVELOPE: Brand: 3M™ STERI-STRIP™

## (undated) DEVICE — INTENDED FOR TISSUE SEPARATION, AND OTHER PROCEDURES THAT REQUIRE A SHARP SURGICAL BLADE TO PUNCTURE OR CUT.: Brand: BARD-PARKER ® CARBON RIB-BACK BLADES

## (undated) DEVICE — SCD SEQUENTIAL COMPRESSION COMFORT SLEEVE MEDIUM KNEE LENGTH: Brand: KENDALL SCD